# Patient Record
Sex: FEMALE | Race: WHITE | NOT HISPANIC OR LATINO | Employment: UNEMPLOYED | URBAN - METROPOLITAN AREA
[De-identification: names, ages, dates, MRNs, and addresses within clinical notes are randomized per-mention and may not be internally consistent; named-entity substitution may affect disease eponyms.]

---

## 2017-02-08 ENCOUNTER — OFFICE VISIT (OUTPATIENT)
Dept: RETAIL CLINIC | Facility: CLINIC | Age: 62
End: 2017-02-08

## 2017-02-08 VITALS
BODY MASS INDEX: 31.28 KG/M2 | SYSTOLIC BLOOD PRESSURE: 158 MMHG | HEART RATE: 110 BPM | HEIGHT: 62 IN | TEMPERATURE: 99 F | OXYGEN SATURATION: 97 % | WEIGHT: 170 LBS | DIASTOLIC BLOOD PRESSURE: 96 MMHG

## 2017-02-08 DIAGNOSIS — J01.10 ACUTE NON-RECURRENT FRONTAL SINUSITIS: Primary | ICD-10-CM

## 2017-02-08 PROCEDURE — 99213 OFFICE O/P EST LOW 20 MIN: CPT | Performed by: NURSE PRACTITIONER

## 2017-02-08 RX ORDER — AMOXICILLIN AND CLAVULANATE POTASSIUM 875; 125 MG/1; MG/1
1 TABLET, FILM COATED ORAL 2 TIMES DAILY
Qty: 20 TABLET | Refills: 0 | Status: SHIPPED | OUTPATIENT
Start: 2017-02-08 | End: 2017-08-08

## 2017-02-08 NOTE — PATIENT INSTRUCTIONS
Sinusitis, Adult  Sinusitis is redness, soreness, and puffiness (inflammation) of the air pockets in the bones of your face (sinuses). The redness, soreness, and puffiness can cause air and mucus to get trapped in your sinuses. This can allow germs to grow and cause an infection.   HOME CARE   · Drink enough fluids to keep your pee (urine) clear or pale yellow.  · Use a humidifier in your home.  · Run a hot shower to create steam in the bathroom. Sit in the bathroom with the door closed. Breathe in the steam 3-4 times a day.  · Put a warm, moist washcloth on your face 3-4 times a day, or as told by your doctor.  · Use salt water sprays (saline sprays) to wet the thick fluid in your nose. This can help the sinuses drain.  · Only take medicine as told by your doctor.  GET HELP RIGHT AWAY IF:   · Your pain gets worse.  · You have very bad headaches.  · You are sick to your stomach (nauseous).  · You throw up (vomit).  · You are very sleepy (drowsy) all the time.  · Your face is puffy (swollen).  · Your vision changes.  · You have a stiff neck.  · You have trouble breathing.  MAKE SURE YOU:   · Understand these instructions.  · Will watch your condition.  · Will get help right away if you are not doing well or get worse.     This information is not intended to replace advice given to you by your health care provider. Make sure you discuss any questions you have with your health care provider.     Document Released: 06/05/2009 Document Revised: 01/08/2016 Document Reviewed: 07/23/2013  [employer] Patient Education ©2016 [employer]

## 2017-02-08 NOTE — PROGRESS NOTES
Subjective   Livier Valerio is a 62 y.o. female.     History of Present Illness   Pt. Presents with left sided sinus pressure and pain radiating to her left ear and down the cervical neck area. She has had low grade fever and is taking tylenol for pain.   She has nasal congestion with yellow exudate and a taste in her mouth like infection.  The following portions of the patient's history were reviewed and updated as appropriate: allergies, current medications, past family history, past medical history, past surgical history and problem list.    Review of Systems   Constitutional: Negative for activity change, appetite change, chills, fatigue and fever.   HENT: Positive for congestion and ear pain (left). Negative for ear discharge, rhinorrhea and sore throat. Sinus pressure: left.    Eyes: Negative.    Respiratory: Negative.    Cardiovascular: Negative.    Musculoskeletal: Positive for myalgias.   Skin: Negative.    Allergic/Immunologic: Negative.    Neurological: Positive for dizziness and headaches.       Objective   Physical Exam   Constitutional: She is oriented to person, place, and time. She appears well-developed and well-nourished.   HENT:   Head: Normocephalic and atraumatic.   Right Ear: Tympanic membrane and external ear normal.   Left Ear: Tympanic membrane and external ear normal.   Nose: Mucosal edema and rhinorrhea present. Right sinus exhibits no maxillary sinus tenderness and no frontal sinus tenderness. Left sinus exhibits maxillary sinus tenderness and frontal sinus tenderness.   Mouth/Throat: Oropharynx is clear and moist and mucous membranes are normal. Tonsils are 0 on the right. Tonsils are 0 on the left. No tonsillar exudate.   Cardiovascular: Normal rate, regular rhythm and normal heart sounds.    Pulmonary/Chest: Effort normal and breath sounds normal. No respiratory distress. She has no wheezes. She has no rales.   Lymphadenopathy:     She has no cervical adenopathy.   Neurological: She is  alert and oriented to person, place, and time.   Skin: Skin is warm and dry.   Psychiatric: She has a normal mood and affect. Her behavior is normal. Judgment and thought content normal.   Nursing note and vitals reviewed.      Assessment/Plan   Livier was seen today for earache and sinusitis.    Diagnoses and all orders for this visit:    Acute non-recurrent frontal sinusitis    Other orders  -     amoxicillin-clavulanate (AUGMENTIN) 875-125 MG per tablet; Take 1 tablet by mouth 2 (Two) Times a Day.    JAYLON Doty

## 2017-08-08 ENCOUNTER — OFFICE VISIT (OUTPATIENT)
Dept: RETAIL CLINIC | Facility: CLINIC | Age: 62
End: 2017-08-08

## 2017-08-08 VITALS
HEART RATE: 84 BPM | SYSTOLIC BLOOD PRESSURE: 162 MMHG | TEMPERATURE: 98.1 F | DIASTOLIC BLOOD PRESSURE: 92 MMHG | RESPIRATION RATE: 18 BRPM

## 2017-08-08 DIAGNOSIS — J01.41 ACUTE RECURRENT PANSINUSITIS: Primary | ICD-10-CM

## 2017-08-08 PROCEDURE — 99213 OFFICE O/P EST LOW 20 MIN: CPT | Performed by: NURSE PRACTITIONER

## 2017-08-08 RX ORDER — AMOXICILLIN AND CLAVULANATE POTASSIUM 875; 125 MG/1; MG/1
1 TABLET, FILM COATED ORAL 2 TIMES DAILY
Qty: 20 TABLET | Refills: 0 | Status: SHIPPED | OUTPATIENT
Start: 2017-08-08 | End: 2017-08-18

## 2017-08-08 NOTE — PROGRESS NOTES
Subjective   Livier Valerio is a 62 y.o. female.     HPI Comments: 2 weeks duration of congestion, worsening with left sided facial pain and left sided ear pain today. Headaches. Using OTC phenylephrine.        The following portions of the patient's history were reviewed and updated as appropriate: allergies, current medications, past family history, past medical history, past social history and past surgical history.    Review of Systems   Constitutional: Positive for chills and fatigue. Negative for appetite change and fever.   HENT: Positive for congestion, ear pain and sinus pressure. Negative for sore throat.    Respiratory: Negative for cough.    Neurological: Positive for headaches.       Objective   Physical Exam   Constitutional: She appears well-developed.   HENT:   Head: Normocephalic.   Right Ear: Tympanic membrane normal.   Left Ear: Tympanic membrane normal.   Nose: Mucosal edema present.   Mouth/Throat: Oropharynx is clear and moist.   Eyes: Pupils are equal, round, and reactive to light.   Neck: Normal range of motion.   Cardiovascular: Normal rate and regular rhythm.    Pulmonary/Chest: Effort normal and breath sounds normal.       Assessment/Plan   Diagnoses and all orders for this visit:    Acute recurrent pansinusitis    Other orders  -     amoxicillin-clavulanate (AUGMENTIN) 875-125 MG per tablet; Take 1 tablet by mouth 2 (Two) Times a Day for 10 days.           Encouraged to not use decongestants due to elevated BP. Encouraged BP recheck in a few days and follow up with PCP

## 2018-03-23 ENCOUNTER — OFFICE VISIT (OUTPATIENT)
Dept: INTERNAL MEDICINE | Facility: CLINIC | Age: 63
End: 2018-03-23

## 2018-03-23 VITALS
SYSTOLIC BLOOD PRESSURE: 128 MMHG | RESPIRATION RATE: 16 BRPM | TEMPERATURE: 97.3 F | BODY MASS INDEX: 30.91 KG/M2 | DIASTOLIC BLOOD PRESSURE: 88 MMHG | WEIGHT: 168 LBS | HEART RATE: 68 BPM | HEIGHT: 62 IN

## 2018-03-23 DIAGNOSIS — M62.838 MUSCLE SPASM: ICD-10-CM

## 2018-03-23 DIAGNOSIS — R07.89 CHEST DISCOMFORT: ICD-10-CM

## 2018-03-23 DIAGNOSIS — Z13.220 LIPID SCREENING: ICD-10-CM

## 2018-03-23 DIAGNOSIS — F41.9 ANXIETY: Primary | ICD-10-CM

## 2018-03-23 DIAGNOSIS — E55.9 VITAMIN D DEFICIENCY: ICD-10-CM

## 2018-03-23 DIAGNOSIS — Z79.899 HIGH RISK MEDICATION USE: ICD-10-CM

## 2018-03-23 LAB
25(OH)D3 SERPL-MCNC: 34.5 NG/ML
ALBUMIN SERPL-MCNC: 4.6 G/DL (ref 3.2–4.8)
ALBUMIN/GLOB SERPL: 1.6 G/DL (ref 1.5–2.5)
ALP SERPL-CCNC: 87 U/L (ref 25–100)
ALT SERPL W P-5'-P-CCNC: 29 U/L (ref 7–40)
ANION GAP SERPL CALCULATED.3IONS-SCNC: 11 MMOL/L (ref 3–11)
ARTICHOKE IGE QN: 134 MG/DL (ref 0–130)
AST SERPL-CCNC: 29 U/L (ref 0–33)
BILIRUB SERPL-MCNC: 0.5 MG/DL (ref 0.3–1.2)
BUN BLD-MCNC: 8 MG/DL (ref 9–23)
BUN/CREAT SERPL: 10 (ref 7–25)
CALCIUM SPEC-SCNC: 9.8 MG/DL (ref 8.7–10.4)
CHLORIDE SERPL-SCNC: 102 MMOL/L (ref 99–109)
CHOLEST SERPL-MCNC: 215 MG/DL (ref 0–200)
CO2 SERPL-SCNC: 28 MMOL/L (ref 20–31)
CREAT BLD-MCNC: 0.8 MG/DL (ref 0.6–1.3)
DEPRECATED RDW RBC AUTO: 42.4 FL (ref 37–54)
ERYTHROCYTE [DISTWIDTH] IN BLOOD BY AUTOMATED COUNT: 12.5 % (ref 11.3–14.5)
GFR SERPL CREATININE-BSD FRML MDRD: 73 ML/MIN/1.73
GLOBULIN UR ELPH-MCNC: 2.8 GM/DL
GLUCOSE BLD-MCNC: 86 MG/DL (ref 70–100)
HCT VFR BLD AUTO: 39.6 % (ref 34.5–44)
HDLC SERPL-MCNC: 54 MG/DL (ref 40–60)
HGB BLD-MCNC: 13.2 G/DL (ref 11.5–15.5)
MCH RBC QN AUTO: 30.8 PG (ref 27–31)
MCHC RBC AUTO-ENTMCNC: 33.3 G/DL (ref 32–36)
MCV RBC AUTO: 92.5 FL (ref 80–99)
PLATELET # BLD AUTO: 278 10*3/MM3 (ref 150–450)
PMV BLD AUTO: 10.1 FL (ref 6–12)
POTASSIUM BLD-SCNC: 4.3 MMOL/L (ref 3.5–5.5)
PROT SERPL-MCNC: 7.4 G/DL (ref 5.7–8.2)
RBC # BLD AUTO: 4.28 10*6/MM3 (ref 3.89–5.14)
SODIUM BLD-SCNC: 141 MMOL/L (ref 132–146)
T4 FREE SERPL-MCNC: 1.27 NG/DL (ref 0.89–1.76)
TRIGL SERPL-MCNC: 128 MG/DL (ref 0–150)
TSH SERPL DL<=0.05 MIU/L-ACNC: 2.11 MIU/ML (ref 0.35–5.35)
WBC NRBC COR # BLD: 6.79 10*3/MM3 (ref 3.5–10.8)

## 2018-03-23 PROCEDURE — 84439 ASSAY OF FREE THYROXINE: CPT | Performed by: INTERNAL MEDICINE

## 2018-03-23 PROCEDURE — 93000 ELECTROCARDIOGRAM COMPLETE: CPT | Performed by: INTERNAL MEDICINE

## 2018-03-23 PROCEDURE — 80053 COMPREHEN METABOLIC PANEL: CPT | Performed by: INTERNAL MEDICINE

## 2018-03-23 PROCEDURE — 80061 LIPID PANEL: CPT | Performed by: INTERNAL MEDICINE

## 2018-03-23 PROCEDURE — 85027 COMPLETE CBC AUTOMATED: CPT | Performed by: INTERNAL MEDICINE

## 2018-03-23 PROCEDURE — 99204 OFFICE O/P NEW MOD 45 MIN: CPT | Performed by: INTERNAL MEDICINE

## 2018-03-23 PROCEDURE — 84443 ASSAY THYROID STIM HORMONE: CPT | Performed by: INTERNAL MEDICINE

## 2018-03-23 PROCEDURE — 82306 VITAMIN D 25 HYDROXY: CPT | Performed by: INTERNAL MEDICINE

## 2018-03-23 PROCEDURE — 36415 COLL VENOUS BLD VENIPUNCTURE: CPT | Performed by: INTERNAL MEDICINE

## 2018-03-23 RX ORDER — ALPRAZOLAM 0.25 MG/1
0.25 TABLET ORAL DAILY PRN
Qty: 30 TABLET | Refills: 1 | Status: SHIPPED | OUTPATIENT
Start: 2018-03-23 | End: 2019-01-22

## 2018-03-23 RX ORDER — UBIDECARENONE 100 MG
100 CAPSULE ORAL DAILY
COMMUNITY

## 2018-03-23 RX ORDER — CYCLOBENZAPRINE HCL 10 MG
10 TABLET ORAL AS NEEDED
Qty: 30 TABLET | Refills: 1 | Status: SHIPPED | OUTPATIENT
Start: 2018-03-23 | End: 2018-08-02 | Stop reason: SDUPTHER

## 2018-03-23 RX ORDER — ALPRAZOLAM 0.25 MG/1
0.25 TABLET ORAL DAILY PRN
COMMUNITY
End: 2018-03-23 | Stop reason: SDUPTHER

## 2018-03-23 RX ORDER — IBUPROFEN 600 MG/1
600 TABLET ORAL EVERY 6 HOURS PRN
COMMUNITY
End: 2018-03-23 | Stop reason: SDUPTHER

## 2018-03-23 RX ORDER — LANOLIN ALCOHOL/MO/W.PET/CERES
CREAM (GRAM) TOPICAL
COMMUNITY
End: 2019-01-22

## 2018-03-23 RX ORDER — CYCLOBENZAPRINE HCL 10 MG
10 TABLET ORAL AS NEEDED
COMMUNITY
End: 2018-03-23 | Stop reason: SDUPTHER

## 2018-03-23 NOTE — PROGRESS NOTES
Subjective   Livier Valerio is a 63 y.o. female.     History of Present Illness     1 mild muscle spasm- occasionally with exercise.  Patient says that she will occasionally do resistance exercises and this will cause some soreness in the upper extremities along the chest wall as well.  She says that after one to 2 days of doing the physical activity she will develop chest soreness/discomfort along associated with the upper extremities and shoulders.  This shortly resolves after physical activity.  Patient denies any dyspnea on exertion, chest pain, syncope, nausea, vomiting, fever, diaphoresis, or any other systemic symptoms.    2 anxiety-chronic and controlled.  Patient is on Xanax 0.25 mg as needed for anxiety.  Patient denies any recent panic attack or any other emotional concerns at this time.    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    Review of Systems   Constitutional: Negative.    HENT: Negative.    Respiratory: Negative for cough, choking, chest tightness, shortness of breath and stridor.    Cardiovascular: Negative for chest pain, palpitations and leg swelling.   Gastrointestinal: Negative.    Genitourinary: Negative.    Musculoskeletal: Negative.  Negative for arthralgias, back pain, gait problem and joint swelling.   Skin: Negative.    Neurological: Negative.  Negative for dizziness, light-headedness, numbness and headaches.   Hematological: Negative.    Psychiatric/Behavioral: Negative.    All other systems reviewed and are negative.      Objective   Physical Exam   Constitutional: She is oriented to person, place, and time. She appears well-developed and well-nourished.   HENT:   Head:  Normocephalic.   Right Ear: External ear normal.   Left Ear: External ear normal.   Nose: Nose normal.   Mouth/Throat: Oropharynx is clear and moist.   Eyes: Conjunctivae and EOM are normal. Pupils are equal, round, and reactive to light.   Neck: Normal range of motion. Neck supple.   Cardiovascular: Normal rate, regular rhythm, normal heart sounds and intact distal pulses.    Pulmonary/Chest: Effort normal and breath sounds normal.   Abdominal: Soft. Bowel sounds are normal.   Musculoskeletal: Normal range of motion.   Neurological: She is alert and oriented to person, place, and time. She has normal reflexes.   Skin: Skin is warm. Capillary refill takes less than 2 seconds.   Nursing note and vitals reviewed.      Assessment/Plan   Livier was seen today for establish care, anxiety and spasms.    Diagnoses and all orders for this visit:    Anxiety  -     CBC (No Diff)  -     Comprehensive Metabolic Panel  -     T4, Free  -     TSH    Muscle spasm    Chest discomfort  -     ECG 12 Lead    Lipid screening  -     Lipid Panel          ECG 12 Lead  Date/Time: 3/23/2018 9:43 AM  Performed by: CHANTALE ORONA  Authorized by: CHANTALE ORONA   Comparison: not compared with previous ECG   Rhythm: sinus rhythm  Rate: normal  Conduction: conduction normal  ST Segments: ST segments normal  QRS axis: normal  Clinical impression: normal ECG

## 2018-03-23 NOTE — TELEPHONE ENCOUNTER
----- Message from Karol aCno sent at 3/23/2018  1:11 PM EDT -----  Contact: SELF  ERYN BERGER WAS SEEN TODAY, SHE IS NEEDING A REFILL FOR IBUPROFEN 600 MG. SHE ALSO SAID THE PHARMACY SAID THEY DID NOT RECEIVE THE ALPRAZOLAM EITHER.  SHE USES THE [facility] IN [place]. SHE CAN BE REACHED [phone]

## 2018-03-26 RX ORDER — IBUPROFEN 600 MG/1
600 TABLET ORAL EVERY 6 HOURS PRN
Qty: 60 TABLET | Refills: 2 | Status: SHIPPED | OUTPATIENT
Start: 2018-03-26 | End: 2019-01-22 | Stop reason: SDUPTHER

## 2018-06-19 ENCOUNTER — CLINICAL SUPPORT (OUTPATIENT)
Dept: RETAIL CLINIC | Facility: CLINIC | Age: 63
End: 2018-06-19

## 2018-06-19 DIAGNOSIS — Z11.1 VISIT FOR TB SKIN TEST: Primary | ICD-10-CM

## 2018-06-19 PROCEDURE — 86580 TB INTRADERMAL TEST: CPT | Performed by: NURSE PRACTITIONER

## 2018-06-19 NOTE — PROGRESS NOTES
Subjective   Livier Valerio is a 63 y.o. female.     Patient presents to clinic for a TB skin test. See scanned documents. Denies history of positive TB skin test, exposure to TB, being on steroid therapy, or being immunosuppressed. Patient will be able to return to clinic in required time frame of 48-72 hours for TB skin test reading.    Diagnosis for this visit:  Visit for TB skin test [Z11.1]    Notes:  A TB skin test was performed for you today. Signs and symptoms of infection and site reaction discussed. Do not pick, scratch, or rub the test site. You must return to the clinic in 48-72 hours, then your copy of the reading result will be provided to you. Patient verbalized understanding.

## 2018-07-17 ENCOUNTER — OFFICE VISIT (OUTPATIENT)
Dept: INTERNAL MEDICINE | Facility: CLINIC | Age: 63
End: 2018-07-17

## 2018-07-17 VITALS
SYSTOLIC BLOOD PRESSURE: 130 MMHG | RESPIRATION RATE: 18 BRPM | BODY MASS INDEX: 31.39 KG/M2 | WEIGHT: 170.6 LBS | HEART RATE: 83 BPM | HEIGHT: 62 IN | DIASTOLIC BLOOD PRESSURE: 85 MMHG | TEMPERATURE: 98.4 F

## 2018-07-17 DIAGNOSIS — R35.0 URINE FREQUENCY: Primary | ICD-10-CM

## 2018-07-17 DIAGNOSIS — Z12.31 SCREENING MAMMOGRAM, ENCOUNTER FOR: ICD-10-CM

## 2018-07-17 LAB
BILIRUB BLD-MCNC: NEGATIVE MG/DL
CLARITY, POC: CLEAR
COLOR UR: YELLOW
EXPIRATION DATE: NORMAL
GLUCOSE UR STRIP-MCNC: NEGATIVE MG/DL
KETONES UR QL: NEGATIVE
LEUKOCYTE EST, POC: NEGATIVE
Lab: NORMAL
NITRITE UR-MCNC: NEGATIVE MG/ML
PH UR: 7 [PH] (ref 5–8)
PROT UR STRIP-MCNC: NEGATIVE MG/DL
RBC # UR STRIP: NEGATIVE /UL
SP GR UR: 1.02 (ref 1–1.03)
UROBILINOGEN UR QL: NORMAL

## 2018-07-17 PROCEDURE — 99213 OFFICE O/P EST LOW 20 MIN: CPT | Performed by: NURSE PRACTITIONER

## 2018-07-17 PROCEDURE — 81003 URINALYSIS AUTO W/O SCOPE: CPT | Performed by: NURSE PRACTITIONER

## 2018-07-17 RX ORDER — SULFAMETHOXAZOLE AND TRIMETHOPRIM 800; 160 MG/1; MG/1
1 TABLET ORAL 2 TIMES DAILY
Qty: 14 TABLET | Refills: 0 | Status: SHIPPED | OUTPATIENT
Start: 2018-07-17 | End: 2018-07-23

## 2018-07-17 NOTE — PROGRESS NOTES
Subjective:    Livier Valerio is a 63 y.o. female.     Chief Complaint   Patient presents with   • Urinary Tract Infection     lower back pain, burning when urinating and frequency x4 days    • Fever     low grade        History of Present Illness   Patient complains of lower back pain and lower abdominal pressure, urinary frequency, urgency and dysuria x 4 days. She has had an elevated temperature of 100.4 treated with Tylenol successfully. Minimal relief with drinking lemon water. Patient states she has changed soap brands recently.    Current Outpatient Prescriptions:   •  ALPRAZolam (XANAX) 0.25 MG tablet, Take 1 tablet by mouth Daily As Needed for Anxiety., Disp: 30 tablet, Rfl: 1  •  coenzyme Q10 100 MG capsule, Take 100 mg by mouth Daily., Disp: , Rfl:   •  cyclobenzaprine (FLEXERIL) 10 MG tablet, Take 1 tablet by mouth As Needed for Muscle Spasms., Disp: 30 tablet, Rfl: 1  •  Garlic 500 MG capsule, Take  by mouth., Disp: , Rfl:   •  ibuprofen (ADVIL,MOTRIN) 600 MG tablet, Take 1 tablet by mouth Every 6 (Six) Hours As Needed for Mild Pain ., Disp: 60 tablet, Rfl: 2  •  Multiple Vitamin (MULTI-DAY VITAMINS PO), Take  by mouth., Disp: , Rfl:   •  Oxymetazoline HCl (NASAL SPRAY NA), into each nostril. [employer] brand, Disp: , Rfl:   •  Probiotic Product (PROBIOTIC DAILY PO), Take  by mouth., Disp: , Rfl:   •  Nutritional Supplements (ESTROVEN PO), Take  by mouth., Disp: , Rfl:   •  Pyridoxine HCl (VITAMIN B-6 PO), Take  by mouth., Disp: , Rfl:   •  sulfamethoxazole-trimethoprim (BACTRIM DS) 800-160 MG per tablet, Take 1 tablet by mouth 2 (Two) Times a Day., Disp: 14 tablet, Rfl: 0     The following portions of the patient's history were reviewed and updated as appropriate: allergies, current medications, past family history, past medical history, past social history, past surgical history and problem list.    Review of Systems   Constitutional: Negative for chills and fever.   Respiratory: Negative for cough and  "shortness of breath.    Cardiovascular: Negative for chest pain.   Gastrointestinal: Positive for abdominal pain. Negative for blood in stool, constipation, diarrhea, nausea and vomiting.        Denies melena.   Genitourinary: Positive for dysuria, frequency and pelvic pain. Negative for flank pain, hematuria, urgency, vaginal bleeding and vaginal discharge.        Denies vaginal itching.   Musculoskeletal: Positive for back pain.   Skin: Negative for rash.   Hematological: Negative for adenopathy.       Objective:    /85 (BP Location: Right arm, Patient Position: Sitting, Cuff Size: Adult)   Pulse 83   Temp 98.4 °F (36.9 °C)   Resp 18   Ht 157.5 cm (62\")   Wt 77.4 kg (170 lb 9.6 oz)   BMI 31.20 kg/m²     Physical Exam   Constitutional: She appears well-developed and well-nourished.   Cardiovascular: Normal rate, regular rhythm and normal heart sounds.    No murmur heard.  Pulmonary/Chest: Effort normal and breath sounds normal.   Abdominal: Soft. Bowel sounds are normal. She exhibits no distension and no mass. There is no hepatosplenomegaly. There is no tenderness. There is no CVA tenderness.   Lymphadenopathy:        Right: No inguinal adenopathy present.        Left: No inguinal adenopathy present.   Neurological: She is alert.   Skin: No rash noted.   Psychiatric: She has a normal mood and affect.   Nursing note and vitals reviewed.      Assessment/Plan:    Livier was seen today for urinary symptoms and fever.    Diagnoses and all orders for this visit:        Livier was seen today for urinary symptoms and fever.    Diagnoses and all orders for this visit:    Urine frequency  -     POCT urinalysis dipstick, automated    Screening mammogram, encounter for  -     Mammo Screening Bilateral With CAD; Future    Other orders  -     sulfamethoxazole-trimethoprim (BACTRIM DS) 800-160 MG per tablet; Take 1 tablet by mouth 2 (Two) Times a Day.          Return if symptoms worsen or fail to improve, schedule " physical with pap.

## 2018-07-21 NOTE — PROGRESS NOTES
Chief Complaint   Patient presents with   • Annual Exam     physical and PAP       Subjective   Physical    History of Present Illness   Physical  Livier Valerio is a 63 y.o. female.     Are you currently seeing any other doctors or specialists?  No  Are you currently taking any OTC medications or herbal medications?   Garlique and echinacea tea  Sleep: 10 hours  Diet: Balanced  Exercise: Walks     Most recent colonoscopy: No and does not plan on it . But agrees to Cologuard  Most recent mammogram: Scheduled 8/22/2018  Most recent pap smear: Today  First day of last menses:  Age 56    Regular dental visits: Dentures  Regular eye exams: Needs to schedule    The following portions of the patient's history were reviewed and updated as appropriate: allergies, current medications, past family history, past medical history, past social history, past surgical history and problem list.      Allergies   Allergen Reactions   • No Known Drug Allergy      Social History   Substance Use Topics   • Smoking status: Never Smoker   • Smokeless tobacco: Never Used   • Alcohol use No     History reviewed. No pertinent surgical history.  Family History   Problem Relation Age of Onset   • Diabetes Mother    • Hyperlipidemia Mother    • Heart attack Maternal Grandmother    • Heart attack Maternal Grandfather          Current Outpatient Prescriptions:   •  ALPRAZolam (XANAX) 0.25 MG tablet, Take 1 tablet by mouth Daily As Needed for Anxiety., Disp: 30 tablet, Rfl: 1  •  coenzyme Q10 100 MG capsule, Take 100 mg by mouth Daily., Disp: , Rfl:   •  cyclobenzaprine (FLEXERIL) 10 MG tablet, Take 1 tablet by mouth As Needed for Muscle Spasms., Disp: 30 tablet, Rfl: 1  •  Garlic 500 MG capsule, Take  by mouth., Disp: , Rfl:   •  ibuprofen (ADVIL,MOTRIN) 600 MG tablet, Take 1 tablet by mouth Every 6 (Six) Hours As Needed for Mild Pain ., Disp: 60 tablet, Rfl: 2  •  Multiple Vitamin (MULTI-DAY VITAMINS PO), Take  by mouth., Disp: , Rfl:   •   Oxymetazoline HCl (NASAL SPRAY NA), into each nostril. [employer] brand, Disp: , Rfl:   •  Probiotic Product (PROBIOTIC DAILY PO), Take  by mouth., Disp: , Rfl:   •  Nutritional Supplements (ESTROVEN PO), Take  by mouth., Disp: , Rfl:   •  Pyridoxine HCl (VITAMIN B-6 PO), Take  by mouth., Disp: , Rfl:   •  sulfamethoxazole-trimethoprim (BACTRIM DS) 800-160 MG per tablet, Take 1 tablet by mouth 2 (Two) Times a Day., Disp: 14 tablet, Rfl: 0    There is no problem list on file for this patient.      Review of Systems   Constitutional: Negative for chills, fatigue and fever.   HENT: Negative for congestion, dental problem, mouth sores, nosebleeds, postnasal drip, rhinorrhea, sinus pressure, sneezing and sore throat.    Eyes: Negative for pain, discharge, redness and itching.   Respiratory: Negative for cough, shortness of breath and wheezing.    Cardiovascular: Negative for chest pain, palpitations and leg swelling.        No TAPIA, orthopnea, PND, or claudication.   Gastrointestinal: Negative for abdominal distention, abdominal pain, blood in stool, diarrhea, nausea and vomiting.   Endocrine: Negative for cold intolerance, heat intolerance, polydipsia and polyuria.   Genitourinary: Negative for difficulty urinating, dysuria, frequency, hematuria and urgency.   Musculoskeletal: Negative for arthralgias, gait problem, joint swelling and myalgias.   Skin: Negative for color change and rash.        No wound.   Allergic/Immunologic: Negative.    Neurological: Negative for dizziness, syncope, weakness, light-headedness and numbness.   Hematological: Negative for adenopathy. Does not bruise/bleed easily.   Psychiatric/Behavioral:        Anxiety       Objective   Vitals:    07/23/18 0817   BP: 138/87   Pulse: 86   Resp: 18   Temp: 97.9 °F (36.6 °C)     Physical Exam   Constitutional: She is oriented to person, place, and time. She appears well-developed and well-nourished. She is cooperative. She is easily aroused.  Non-toxic  appearance. She does not have a sickly appearance. She does not appear ill. No distress.   HENT:   Head: Normocephalic and atraumatic. Head is without abrasion. Hair is normal.   Right Ear: Hearing, tympanic membrane, external ear and ear canal normal. No foreign bodies. Tympanic membrane is not perforated and not erythematous.   Left Ear: Hearing, tympanic membrane, external ear and ear canal normal. No foreign bodies. Tympanic membrane is not perforated and not erythematous.   Nose: Nose normal. No mucosal edema, rhinorrhea or septal deviation. No epistaxis.  No foreign bodies.   Mouth/Throat: Oropharynx is clear and moist. No oral lesions. Normal dentition.   Eyes: Pupils are equal, round, and reactive to light. Conjunctivae and lids are normal. Right eye exhibits no discharge. Left eye exhibits no discharge. Right conjunctiva is not injected. Left conjunctiva is not injected. No scleral icterus.   Neck: Normal range of motion and full passive range of motion without pain. Neck supple. No edema and normal range of motion present. No thyroid mass and no thyromegaly present.   Cardiovascular: Normal rate, regular rhythm and normal heart sounds.  Exam reveals no gallop and no friction rub.    No murmur heard.  Pulmonary/Chest: Effort normal and breath sounds normal. No accessory muscle usage. She has no rhonchi. She has no rales. She exhibits no mass, no tenderness, no bony tenderness, no laceration, no crepitus, no edema, no deformity, no swelling and no retraction. Right breast exhibits no inverted nipple, no mass, no nipple discharge, no skin change and no tenderness. Left breast exhibits no inverted nipple, no mass, no nipple discharge, no skin change and no tenderness. Breasts are symmetrical. There is no breast swelling.   Abdominal: Soft. Bowel sounds are normal. She exhibits no distension. There is no hepatosplenomegaly or hepatomegaly. There is no tenderness. There is no CVA tenderness. Hernia confirmed  negative in the right inguinal area and confirmed negative in the left inguinal area.   Genitourinary: Rectum normal, vagina normal, uterus normal and cervix normal. Pelvic exam was performed with patient supine. There is no rash, tenderness, lesion, injury or Bartholin's cyst on the right labia. There is no rash, tenderness, lesion, injury or Bartholin's cyst on the left labia. Right adnexum displays fullness. Right adnexum displays no mass and no tenderness. Right adnexum is present and palpable.Left adnexum displays no mass, no tenderness and no fullness. Left adnexum is present and palpable.  Musculoskeletal: Normal range of motion. She exhibits no edema, tenderness or deformity.   Lymphadenopathy:     She has no cervical adenopathy.        Right: No inguinal adenopathy present.        Left: No inguinal adenopathy present.   Neurological: She is alert, oriented to person, place, and time and easily aroused. She has normal reflexes. No cranial nerve deficit. Coordination normal.   Muscle strength 5/5 and equal throughout.   Skin: Skin is warm, dry and intact. No abrasion and no rash noted. She is not diaphoretic. No cyanosis or erythema. Nails show no clubbing.   Psychiatric: She has a normal mood and affect. Her speech is normal and behavior is normal.   Nursing note and vitals reviewed.        Results for orders placed or performed in visit on 07/17/18   POCT urinalysis dipstick, automated   Result Value Ref Range    Color Yellow Yellow, Straw, Dark Yellow, Lyly    Clarity, UA Clear Clear    Specific Gravity  1.020 1.005 - 1.030    pH, Urine 7.0 5.0 - 8.0    Leukocytes Negative Negative    Nitrite, UA Negative Negative    Protein, POC Negative Negative mg/dL    Glucose, UA Negative Negative, 1000 mg/dL (3+) mg/dL    Ketones, UA Negative Negative    Urobilinogen, UA Normal Normal    Bilirubin Negative Negative    Blood, UA Negative Negative    Lot Number [id number]     Expiration Date 02/28/19         Assessment/Plan   Livier was seen today for annual exam.    Diagnoses and all orders for this visit:    Encounter for preventive health examination    Need for hepatitis C screening test  -     Cologuard - Stool, Per Rectum; Future    Encounter for screening colonoscopy  -     Hepatitis C antibody    Pap smear for cervical cancer screening  -     Liquid-based Pap Smear, Screening; Future    Need for vaccination  -     Tdap Vaccine Greater Than or Equal To 6yo IM        Discussed getting Shingrix vaccine at pharmacy.  Maintain mammogram appointment and complete Cologuard test.       Patient education discussed during this visit:  - avoidance of texting while driving and the need for wearing seatbelt  - use of sunscreen  - healthy sleep habits and appropriate amount of sleep  - H2O consumption, well-balanced diet  - exercise routine which includes at least 150 minutes of cardio per week + muscle strengthening exercises  - immunizations including annual flu vaccination      Return in about 1 year (around 7/23/2019), or if symptoms worsen or fail to improve.

## 2018-07-23 ENCOUNTER — RESULTS ENCOUNTER (OUTPATIENT)
Dept: INTERNAL MEDICINE | Facility: CLINIC | Age: 63
End: 2018-07-23

## 2018-07-23 ENCOUNTER — OFFICE VISIT (OUTPATIENT)
Dept: INTERNAL MEDICINE | Facility: CLINIC | Age: 63
End: 2018-07-23

## 2018-07-23 VITALS
WEIGHT: 169.2 LBS | SYSTOLIC BLOOD PRESSURE: 138 MMHG | BODY MASS INDEX: 31.14 KG/M2 | RESPIRATION RATE: 18 BRPM | DIASTOLIC BLOOD PRESSURE: 87 MMHG | HEIGHT: 62 IN | TEMPERATURE: 97.9 F | HEART RATE: 86 BPM

## 2018-07-23 DIAGNOSIS — Z23 NEED FOR VACCINATION: ICD-10-CM

## 2018-07-23 DIAGNOSIS — Z00.00 ENCOUNTER FOR PREVENTIVE HEALTH EXAMINATION: Primary | ICD-10-CM

## 2018-07-23 DIAGNOSIS — Z11.59 NEED FOR HEPATITIS C SCREENING TEST: ICD-10-CM

## 2018-07-23 DIAGNOSIS — Z12.4 PAP SMEAR FOR CERVICAL CANCER SCREENING: ICD-10-CM

## 2018-07-23 DIAGNOSIS — Z12.11 SCREEN FOR COLON CANCER: ICD-10-CM

## 2018-07-23 LAB — HCV AB SER DONR QL: NORMAL

## 2018-07-23 PROCEDURE — 36415 COLL VENOUS BLD VENIPUNCTURE: CPT | Performed by: NURSE PRACTITIONER

## 2018-07-23 PROCEDURE — 99396 PREV VISIT EST AGE 40-64: CPT | Performed by: NURSE PRACTITIONER

## 2018-07-23 PROCEDURE — 86803 HEPATITIS C AB TEST: CPT | Performed by: NURSE PRACTITIONER

## 2018-07-23 PROCEDURE — 90471 IMMUNIZATION ADMIN: CPT | Performed by: NURSE PRACTITIONER

## 2018-07-23 PROCEDURE — 90715 TDAP VACCINE 7 YRS/> IM: CPT | Performed by: NURSE PRACTITIONER

## 2018-07-26 ENCOUNTER — TELEPHONE (OUTPATIENT)
Dept: INTERNAL MEDICINE | Facility: CLINIC | Age: 63
End: 2018-07-26

## 2018-08-02 DIAGNOSIS — M62.838 MUSCLE SPASM: ICD-10-CM

## 2018-08-02 RX ORDER — CYCLOBENZAPRINE HCL 10 MG
10 TABLET ORAL AS NEEDED
Qty: 30 TABLET | Refills: 1 | Status: SHIPPED | OUTPATIENT
Start: 2018-08-02 | End: 2018-08-07 | Stop reason: SDUPTHER

## 2018-08-07 ENCOUNTER — TELEPHONE (OUTPATIENT)
Dept: INTERNAL MEDICINE | Facility: CLINIC | Age: 63
End: 2018-08-07

## 2018-08-07 ENCOUNTER — HOSPITAL ENCOUNTER (OUTPATIENT)
Dept: GENERAL RADIOLOGY | Facility: HOSPITAL | Age: 63
Discharge: HOME | End: 2018-08-07
Admitting: NURSE PRACTITIONER

## 2018-08-07 ENCOUNTER — OFFICE VISIT (OUTPATIENT)
Dept: INTERNAL MEDICINE | Facility: CLINIC | Age: 63
End: 2018-08-07

## 2018-08-07 VITALS
RESPIRATION RATE: 17 BRPM | SYSTOLIC BLOOD PRESSURE: 140 MMHG | BODY MASS INDEX: 31.62 KG/M2 | WEIGHT: 171.8 LBS | TEMPERATURE: 97.2 F | DIASTOLIC BLOOD PRESSURE: 88 MMHG | HEIGHT: 62 IN | HEART RATE: 87 BPM

## 2018-08-07 DIAGNOSIS — M62.838 MUSCLE SPASM: ICD-10-CM

## 2018-08-07 DIAGNOSIS — M54.50 ACUTE LOW BACK PAIN WITHOUT SCIATICA, UNSPECIFIED BACK PAIN LATERALITY: Primary | ICD-10-CM

## 2018-08-07 DIAGNOSIS — M54.50 ACUTE LOW BACK PAIN WITHOUT SCIATICA, UNSPECIFIED BACK PAIN LATERALITY: ICD-10-CM

## 2018-08-07 PROBLEM — T14.8XXA MUSCLE STRAIN: Status: ACTIVE | Noted: 2018-08-07

## 2018-08-07 PROCEDURE — 99214 OFFICE O/P EST MOD 30 MIN: CPT | Performed by: NURSE PRACTITIONER

## 2018-08-07 PROCEDURE — 72100 X-RAY EXAM L-S SPINE 2/3 VWS: CPT

## 2018-08-07 RX ORDER — CYCLOBENZAPRINE HCL 10 MG
10 TABLET ORAL AS NEEDED
Qty: 30 TABLET | Refills: 0 | Status: SHIPPED | OUTPATIENT
Start: 2018-08-07 | End: 2019-06-14 | Stop reason: SDUPTHER

## 2018-08-07 RX ORDER — PREDNISONE 20 MG/1
20 TABLET ORAL 2 TIMES DAILY
Qty: 10 TABLET | Refills: 0 | Status: SHIPPED | OUTPATIENT
Start: 2018-08-07 | End: 2018-12-21

## 2018-08-07 NOTE — TELEPHONE ENCOUNTER
----- Message from JAYLON Gamino sent at 8/7/2018  1:10 PM EDT -----  Please inform patient that back x ray shows- Lumbar scoliosis with diffuse  osteoarthritis. There are no acute findings, however.

## 2018-08-07 NOTE — PROGRESS NOTES
"Subjective:    Livier Valerio is a 63 y.o. female.     Chief Complaint   Patient presents with   • Back Pain     lower back pain, hurt while tugging on someone x7 days        History of Present Illness   Patient complains of lower back pain that began last Tuesday when she was attempting to lift someone up in bed. This person is unable to assist lifting at all and weighs approximately 170 pounds. Pain is intermittent. Pain worsened with certain positions. She has used \"Icy Hot\", muscle relaxants,Tylenol and ibuprofen, rib belt and heat. When pain occurs it is stabbing pain and rates pain at 12/0-10 scale. Patient not interested in Toradol. Patient requesting muscle relaxant, steroids and pain medicine.     Current Outpatient Prescriptions:   •  coenzyme Q10 100 MG capsule, Take 100 mg by mouth Daily., Disp: , Rfl:   •  cyclobenzaprine (FLEXERIL) 10 MG tablet, Take 1 tablet by mouth As Needed for Muscle Spasms., Disp: 30 tablet, Rfl: 0  •  Garlic 500 MG capsule, Take  by mouth., Disp: , Rfl:   •  ibuprofen (ADVIL,MOTRIN) 600 MG tablet, Take 1 tablet by mouth Every 6 (Six) Hours As Needed for Mild Pain ., Disp: 60 tablet, Rfl: 2  •  Multiple Vitamin (MULTI-DAY VITAMINS PO), Take  by mouth., Disp: , Rfl:   •  Oxymetazoline HCl (NASAL SPRAY NA), into each nostril. [employer] brand, Disp: , Rfl:   •  Probiotic Product (PROBIOTIC DAILY PO), Take  by mouth., Disp: , Rfl:   •  ALPRAZolam (XANAX) 0.25 MG tablet, Take 1 tablet by mouth Daily As Needed for Anxiety., Disp: 30 tablet, Rfl: 1  •  Nutritional Supplements (ESTROVEN PO), Take  by mouth., Disp: , Rfl:   •  predniSONE (DELTASONE) 20 MG tablet, Take 1 tablet by mouth 2 (Two) Times a Day., Disp: 10 tablet, Rfl: 0  •  Pyridoxine HCl (VITAMIN B-6 PO), Take  by mouth., Disp: , Rfl:      The following portions of the patient's history were reviewed and updated as appropriate: allergies, current medications, past family history, past medical history, past social history, past " "surgical history and problem list.    Review of Systems   Constitutional: Negative for chills, fatigue and fever.   HENT: Negative for congestion, dental problem, mouth sores, nosebleeds, postnasal drip, rhinorrhea, sinus pain, sinus pressure, sneezing and sore throat.    Eyes: Negative for pain, discharge, redness and itching.   Respiratory: Negative for cough, shortness of breath and wheezing.    Cardiovascular: Negative for chest pain, palpitations and leg swelling.   Gastrointestinal: Negative for abdominal distention, abdominal pain, blood in stool, diarrhea, nausea and vomiting.   Endocrine: Negative for cold intolerance, heat intolerance, polydipsia and polyuria.   Genitourinary: Negative for difficulty urinating, dysuria, frequency, hematuria and urgency.   Musculoskeletal: Positive for back pain. Negative for arthralgias, gait problem, joint swelling and myalgias.   Skin: Negative for color change, rash and wound.   Neurological: Negative for dizziness, syncope, weakness, light-headedness, numbness and headaches.   Hematological: Negative for adenopathy. Does not bruise/bleed easily.       Objective:    /88 (BP Location: Right arm, Patient Position: Sitting, Cuff Size: Adult)   Pulse 87   Temp 97.2 °F (36.2 °C)   Resp 17   Ht 157.5 cm (62\")   Wt 77.9 kg (171 lb 12.8 oz)   BMI 31.42 kg/m²     Physical Exam   Constitutional: She is oriented to person, place, and time. She appears well-developed and well-nourished.   Neck: Normal range of motion. Neck supple.   There is no tenderness to palpation of the cervical spine or paraspinal muscles.   Cardiovascular: Normal rate, regular rhythm and normal heart sounds.    No murmur heard.  Pulmonary/Chest: Effort normal and breath sounds normal.   Abdominal: Soft. Bowel sounds are normal. She exhibits no distension and no mass. There is no hepatosplenomegaly. There is no tenderness.   No CVA tenderness.   Musculoskeletal:        Lumbar back: She exhibits " decreased range of motion, swelling and spasm. She exhibits no tenderness and no bony tenderness.   There is no tenderness to palpation over the lumbar or thoracic spine or paraspinal muscles.  Straight leg raise is negative bilaterally.  There is no pain with right hip flexion, extension, abduction, and adduction.  There is no pain with left hip flexion, extension, abduction, and adduction.   Neurological: She is alert and oriented to person, place, and time.   Skin: Skin is warm, dry and intact. No rash noted.   Psychiatric: She has a normal mood and affect.   Nursing note and vitals reviewed.      Assessment/Plan:    Livier was seen today for back pain.    Diagnoses and all orders for this visit:    Acute low back pain without sciatica, unspecified back pain laterality  -     XR spine lumbar 2 or 3 vw; Future  -     Ambulatory Referral to Physical Therapy    Muscle spasm  -     XR spine lumbar 2 or 3 vw; Future  -     cyclobenzaprine (FLEXERIL) 10 MG tablet; Take 1 tablet by mouth As Needed for Muscle Spasms.  -     Ambulatory Referral to Physical Therapy    Other orders  -     predniSONE (DELTASONE) 20 MG tablet; Take 1 tablet by mouth 2 (Two) Times a Day.        Return if symptoms worsen or fail to improve.

## 2018-08-08 ENCOUNTER — TELEPHONE (OUTPATIENT)
Dept: INTERNAL MEDICINE | Facility: CLINIC | Age: 63
End: 2018-08-08

## 2018-08-08 NOTE — TELEPHONE ENCOUNTER
PT WAS RETURNING A CALL SHE RECEIVED FROM OUR OFFICE IN REGARDS TO HER X-RAYS AND WOULD LIKE TO GET A CALL BACK -[phone]

## 2018-08-10 ENCOUNTER — TELEPHONE (OUTPATIENT)
Dept: INTERNAL MEDICINE | Facility: CLINIC | Age: 63
End: 2018-08-10

## 2018-08-10 NOTE — TELEPHONE ENCOUNTER
I cannot give more prednisone at this time. Please complete physical therapy and see if this gives relief for back pain.

## 2018-08-10 NOTE — TELEPHONE ENCOUNTER
----- Message from Brittni Strange sent at 8/10/2018  9:47 AM EDT -----  PATIENT STATED THAT SHE IS STARTING THERAPY TOMORROW AND IS WANTING TO KNOW IF SHE CAN GET SOME ADDITIONAL predniSONE (DELTASONE) 20 MG tablet    [facility] [address] [place] [address] [phone] [place] [phone]     PATIENT CALL BACK : [phone]    THANK YOU

## 2018-08-14 ENCOUNTER — TELEPHONE (OUTPATIENT)
Dept: INTERNAL MEDICINE | Facility: CLINIC | Age: 63
End: 2018-08-14

## 2018-08-14 RX ORDER — MELOXICAM 15 MG/1
15 TABLET ORAL DAILY
Qty: 30 TABLET | Refills: 0 | Status: SHIPPED | OUTPATIENT
Start: 2018-08-14 | End: 2018-12-21

## 2018-08-14 NOTE — TELEPHONE ENCOUNTER
INFORMED PATIENT OF MED AND LET HER KNOW THAT RX WAS ALREADY CALLED INTO THE PHARMACY. PATIENT WAS GRATEFUL AND VERB. UNDERSTANDING.

## 2018-08-14 NOTE — TELEPHONE ENCOUNTER
The injection is Toradol. I will order some oral antiinflammatory (Mobic)  medicine and she can begin that.

## 2018-08-14 NOTE — TELEPHONE ENCOUNTER
----- Message from Deborah Puentes LPN sent at 8/14/2018  8:25 AM EDT -----  Patient states that she was in to be seen for back pain and Christiano gave her oral steroids, but stated that if that didn't work there was a shot that she could give patient to help.  Patient is wondering if she can just come in to get this shot if it is ordered or does she need an appointment.  Patient call back number is [phone].

## 2018-08-22 ENCOUNTER — HOSPITAL ENCOUNTER (OUTPATIENT)
Dept: MAMMOGRAPHY | Facility: HOSPITAL | Age: 63
Discharge: HOME | End: 2018-08-22
Admitting: NURSE PRACTITIONER

## 2018-08-22 ENCOUNTER — TELEPHONE (OUTPATIENT)
Dept: INTERNAL MEDICINE | Facility: CLINIC | Age: 63
End: 2018-08-22

## 2018-08-22 DIAGNOSIS — Z12.31 SCREENING MAMMOGRAM, ENCOUNTER FOR: ICD-10-CM

## 2018-08-22 PROCEDURE — 77063 BREAST TOMOSYNTHESIS BI: CPT | Performed by: RADIOLOGY

## 2018-08-22 PROCEDURE — 77067 SCR MAMMO BI INCL CAD: CPT

## 2018-08-22 PROCEDURE — 77067 SCR MAMMO BI INCL CAD: CPT | Performed by: RADIOLOGY

## 2018-08-22 PROCEDURE — 77063 BREAST TOMOSYNTHESIS BI: CPT

## 2018-09-13 DIAGNOSIS — M62.838 MUSCLE SPASM: ICD-10-CM

## 2018-09-14 ENCOUNTER — TELEPHONE (OUTPATIENT)
Dept: INTERNAL MEDICINE | Facility: CLINIC | Age: 63
End: 2018-09-14

## 2018-09-14 RX ORDER — CYCLOBENZAPRINE HCL 10 MG
TABLET ORAL
Qty: 30 TABLET | Refills: 1 | Status: SHIPPED | OUTPATIENT
Start: 2018-09-14 | End: 2018-09-18 | Stop reason: SDUPTHER

## 2018-09-14 NOTE — TELEPHONE ENCOUNTER
----- Message from Jacki Reyes sent at 9/14/2018  9:06 AM EDT -----  [facility] [phone]    NEEDS CLARIFICATION ON CYCLOBENZAPRINE DIRECTIONS-WHAT IS THE MAX DAILY?

## 2018-09-18 DIAGNOSIS — M62.838 MUSCLE SPASM: ICD-10-CM

## 2018-09-18 RX ORDER — CYCLOBENZAPRINE HCL 10 MG
10 TABLET ORAL 3 TIMES DAILY PRN
Qty: 50 TABLET | Refills: 2 | Status: SHIPPED | OUTPATIENT
Start: 2018-09-18 | End: 2018-12-21

## 2018-09-18 NOTE — TELEPHONE ENCOUNTER
Patient was prescribed 10mg on 9/14/2018. SIG states: TAKE 1 TABLET BY MOUTH AS NEEDED FOR MUSCLE SPASM. Is patient to take TID PRN. Please advise/clarify

## 2018-09-24 ENCOUNTER — HOSPITAL ENCOUNTER (OUTPATIENT)
Dept: PHYSICAL THERAPY | Facility: HOSPITAL | Age: 63
Setting detail: THERAPIES SERIES
Discharge: HOME | End: 2018-09-24

## 2018-09-24 DIAGNOSIS — M54.50 MIDLINE LOW BACK PAIN WITHOUT SCIATICA, UNSPECIFIED CHRONICITY: Primary | ICD-10-CM

## 2018-09-24 PROCEDURE — 97110 THERAPEUTIC EXERCISES: CPT | Performed by: PHYSICAL THERAPIST

## 2018-09-24 PROCEDURE — 97161 PT EVAL LOW COMPLEX 20 MIN: CPT | Performed by: PHYSICAL THERAPIST

## 2018-09-24 NOTE — THERAPY EVALUATION
Outpatient Physical Therapy Ortho Initial Evaluation  [facility]     Patient Name: Livier Valerio  : 1955  MRN: [id number]  Today's Date: 2018      Visit Date: 2018    Patient Active Problem List   Diagnosis   • Muscle strain        Past Medical History:   Diagnosis Date   • Acid reflux    • Hyperlipidemia    • Sinusitis         History reviewed. No pertinent surgical history.    Visit Dx:     ICD-10-CM ICD-9-CM   1. 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 Pt noted mild back strain at the time of movement but had no pain until she got home. LBP increased throughout the evening and became severe in the next few days. Pt saw her physician who prescribed a muscle relaxer and prednisone and referred her to PT. Pt attempted to perform exercises from previous PT experience from years ago but had inc abdominal pain which she believes is from a hernia. Pt stated that current back pain is not as severe as the initial few days after injury but hurts with change in position and can spread throughout her low back.   -LS      Previous treatment for THIS PROBLEM Medication   Prednisone, muscle relaxer   -LS      Current Tobacco Use none  -LS      Smoking Status none  -LS      Patient's Rating of General Health Very good  -LS      Hand Dominance right-handed  -LS      How has patient tried to help current problem? Icy hot, heat pads, ibuprofen, muscle relaxer, prednisone  -LS         Pain     Pain Location Back  -LS      Pain at Present 3  -LS      Pain at Best 0  -LS      Pain at Worst 10  -LS      Pain Frequency Constant/continuous  -LS      Pain Description Throbbing;Tightness   -LS      What Performance Factors Make the Current Problem(s) WORSE? Standing, lifting heavy objects, trunk rotations  -LS      What Performance Factors Make the Current Problem(s) BETTER? Sitting bent over, sidelying   -LS      Is your sleep disturbed? Yes  -LS      Is medication used to assist with sleep? No  -LS      What position do you sleep in? Left sidelying;Right sidelying  -LS      Difficulties with ADL's? Lifting heavy objects, vacuuming   -LS      Difficulties with recreational activities? Recreational walking   -LS         Fall Risk Assessment    Any falls in the past year: No  -LS         Daily Activities    Primary Language English  -LS      Are you able to read Yes  -LS      Are you able to write Yes  -LS      How does patient learn best? Demonstration  -LS      Teaching needs identified Home Exercise Program;Management of Condition  -LS      Patient is concerned about/has problems with Performing home management (household chores, shopping, care of dependents);Performing job responsibilities/community activities (work, school,  -LS      Does patient have problems with the following? Panic Attack;Anxiety  -LS      Barriers to learning None  -LS      Pt Participated in POC and Goals Yes  -LS         Safety    Are you being hurt, hit, or frightened by anyone at home or in your life? No  -LS      Are you being neglected by a caregiver No  -LS        User Key  (r) = Recorded By, (t) = Taken By, (c) = Cosigned By    Initials Name Provider Type    Eric Monteiro PT Physical Therapist                PT Ortho     Row Name 09/24/18 1300       Precautions and Contraindications    Precautions/Limitations no known precautions/limitations  -LS       Posture/Observations    Alignment Options Lumbar lordosis;Rounded shoulders  -LS    Rounded Shoulders Mild;Bilateral:  -LS    Lumbar lordosis Increased;Mild  -LS    Posture/Observations Comments Decreased multifidus and lumbar paraspinal mm activation and  coordination with prone hip extension. R worse than L.  -LS       Quarter Clearing    Quarter Clearing Lower Quarter Clearing  -LS       DTR- Lower Quarter Clearing    Patellar tendon (L2-4) Bilateral:;2- Normal response  -LS    Achilles tendon (S1-2) Bilateral:;2- Normal response  -LS       SI/Hip Screen- Lower Quarter Clearing    Hugo's/Domingo's test Bilateral:;Negative  -LS    Posterior thigh sheer Bilateral:;Negative  -LS       Special Tests/Palpation    Special Tests/Palpation Lumbar/SI  -LS       Lumbosacral Accessory Motions    Lumbosacral Accessory Motions Tested? Yes  -LS    PA Glide- L1 WNL  -LS    PA [place]- L2 WNL  -LS    PA [place]- L3 WNL  -LS    PA [place]- L4 WNL  -LS    PA [place]- L5 WNL  -LS    PA glide- Sacral base WNL  -LS    PA glide- Sacral apex WNL  -LS       Lumbosacral Palpation    Lumbosacral Palpation? Yes  -LS    Erector Spinae (Paraspinals) Bilateral:;Tender   No other TTP noted   -LS       General ROM    Head/Neck/Trunk Trunk Extension;Trunk Flexion;Trunk Rt Lateral Flexion;Trunk Lt Lateral Flexion  -LS    RT Lower Ext Rt Hip External Rotation;Rt Hip Internal Rotation  -LS    LT Lower Ext Lt Hip External Rotation;Lt Hip Internal Rotation  -LS       Head/Neck/Trunk    Trunk Extension AROM 100%  -LS    Trunk Flexion AROM 8 cm  -LS    Trunk Lt Lateral Flexion AROM KJL  -LS    Trunk Rt Lateral Flexion AROM KJL  -LS       Right Lower Ext    Rt Hip External Rotation AROM 50  -LS    Rt Hip Internal Rotation AROM 40  -LS       Left Lower Ext    Lt Hip External Rotation AROM 55  -LS    Lt Hip Internal Rotation AROM 40  -LS       MMT (Manual Muscle Testing)    Rt Lower Ext Rt Hip Flexion;Rt Hip ABduction;Rt Hip Extension;Rt Knee Extension;Rt Knee Flexion;Rt Ankle Plantarflexion;Rt Ankle Dorsiflexion;Rt Ankle Subtalar Inversion;Rt Ankle Subtalar Eversion;Rt MTP Extension;Rt Hip Internal (Medial) Rotation;Rt Hip External (Lateral) Rotation  -LS    Lt Lower Ext Lt Hip Flexion;Lt Hip Extension;Lt Hip  ABduction;Lt Knee Extension;Lt Hip Internal (Medial) Rotation;Lt Hip External (Lateral) Rotation;Lt Knee Flexion;Lt Ankle Plantarflexion;Lt Ankle Dorsiflexion;Lt Ankle Subtalar Inversion;Lt Ankle Subtalar Eversion;Lt MTP Extension  -LS       MMT Right Lower Ext    Rt Hip Flexion MMT, Gross Movement (5/5) normal  -LS    Rt Hip Extension MMT, Gross Movement (4-/5) good minus  -LS    Rt Hip ABduction MMT, Gross Movement (4/5) good  -LS    Rt Hip Internal (Medial) Rotation MMT, Gross Movement (5/5) normal  -LS    Rt Hip External (Lateral) Rotation MMT, Gross Movement (5/5) normal  -LS    Rt Knee Extension MMT, Gross Movement (5/5) normal  -LS    Rt Knee Flexion MMT, Gross Movement (5/5) normal  -LS    Rt Ankle Plantarflexion MMT, Gross Movement (5/5) normal  -LS    Rt Ankle Dorsiflexion MMT, Gross Movement (5/5) normal  -LS    Rt Ankle Subtalar Inversion MT, Gross Movement (5/5) normal  -LS    Rt Ankle Subtalar Eversion MMT, Gross Movement (5/5) normal  -LS    Rt MTP Extension MMT, Gross Movement (5/5) normal  -LS       MMT Left Lower Ext    Lt Hip Flexion MMT, Gross Movement (4/5) good  -LS    Lt Hip Extension MMT, Gross Movement (4-/5) good minus  -LS    Lt Hip ABduction MMT, Gross Movement (4-/5) good minus  -LS    Lt Hip Internal (Medial) Rotation MMT, Gross Movement (4-/5) good minus  -LS    Lt Hip External (Lateral) Rotation MMT, Gross Movement (5/5) normal  -LS    Lt Knee Extension MMT, Gross Movement (5/5) normal  -LS    Lt Knee Flexion MMT, Gross Movement (5/5) normal  -LS    Lt Ankle Plantarflexion MMT, Gross Movement (5/5) normal  -LS    Lt Ankle Dorsiflexion MMT, Gross Movement (5/5) normal  -LS    Lt Ankle Subtalar Inversion MMT, Gross Movement (5/5) normal  -LS    Lt Ankle Subtalar Eversion MMT, Gross Movement (5/5) normal  -LS    Lt MTP Extension MMT, Gross Movement (5/5) normal  -LS       Flexibility    Flexibility Tested? Lower Extremity  -LS       Lower Extremity Flexibility    Hamstrings  Bilateral:;WNL  -LS       Pathomechanics    Pathomechanics Comments Significant anterior knee position with functional squat. Poor anterior trunk lean with sit to stand.   -LS      User Key  (r) = Recorded By, (t) = Taken By, (c) = Cosigned By    Initials Name Provider Type    Eric Monteiro, PT Physical Therapist                      Therapy Education  Education Details: HEP prescribed and reviewed. Pt educated on the importance of mobility and regular exercise. Educated on proper sit to stand technique.  Given: HEP, Pain management, Symptoms/condition management, Posture/body mechanics  Program: New  How Provided: Verbal, Demonstration, Written  Provided to: Patient  Level of Understanding: Teach back education performed, Verbalized, Demonstrated           PT OP Goals     Row Name 09/24/18 1300          PT Short Term Goals    STG Date to Achieve 10/22/18  -LS     STG 1 Pt will be independent and compliant with HEP.   -LS     STG 1 Progress New  -LS     STG 2 Pt will decrease complaints of pain to 2/10 at rest, 5/10 with activity.  -LS     STG 2 Progress New  -LS     STG 3 Pt will perform functional squats with appropriate knee and foot position and no provocation of low back pain.   -LS     STG 3 Progress New  -LS     STG 4 Pt will demonstrate B LE MMT hip abd 4/5, hip ext 4/5, hip IR 5/5  -LS     STG 4 Progress New  -LS        Long Term Goals    LTG Date to Achieve 11/19/18  -LS     LTG 1 Pt will be independent and compliant with HEP and self-maintenance of condition.  -LS     LTG 1 Progress New  -LS     LTG 2 Pt will decrease complaints of pain to 0/10 at rest, 2/10 with activity.  -LS     LTG 2 Progress New  -LS     LTG 3 Pt will demonstrate B LE MMT hip abd 5/5, hip ext 5/5, hip IR 5/5  -LS     LTG 3 Progress New  -LS     LTG 4 Pt will perform all ADLs with no limitations.   -LS     LTG 4 Progress New  -LS     LTG 5 Pt will return to recreational activities with no limitations.    -LS     LTG 5 Progress New   -LS        Time Calculation    PT Goal Re-Cert Due Date 12/23/18  -LS       User Key  (r) = Recorded By, (t) = Taken By, (c) = Cosigned By    Initials Name Provider Type    LS Eric Desouza, PT Physical Therapist                PT Assessment/Plan     Row Name 09/24/18 3543          PT Assessment    Functional Limitations Limitation in home management;Limitations in community activities;Limitations in functional capacity and performance  -LS     Impairments Pain;Muscle strength;Poor body mechanics;Coordination;Motor function  -LS     Assessment Comments Pt is a 64 yo F who presented with signs and symptoms consistent with muscle strain and muscular incoordination of lumbar paraspinals. Pt demonstrated normal mm tone of B paraspinals with mild tightness on the L but no apparent TTP. She displayed poor muscle activation of multifidi and paraspinals with hip extension in supine indicative of incoordination leading to decreased stability around the lumbar spine vertebrae. Pt had back pain with prone press ups, reduced with manual overpressure to L3-5, indicating she will likely benefit from stabilization exercises. She had B hip mm weakness and poor squat mechanics that may contribute to back pain. Pt will likely benefit from skilled PT intervention to improve mm stability of spine, reduce pain, and improve function.   -LS     Please refer to paper survey for additional self-reported information Yes  -LS     Rehab Potential Excellent  -LS     Patient/caregiver participated in establishment of treatment plan and goals Yes  -LS     Patient would benefit from skilled therapy intervention Yes  -LS        PT Plan    PT Frequency 2x/week  -LS     Predicted Duration of Therapy Intervention (Therapy Eval) 8-12 visits  -LS     Planned CPT's? PT EVAL LOW COMPLEXITY: 86941;PT RE-EVAL: 85301;PT MANUAL THERAPY EA 15 MIN: 37962;PT THER ACT EA 15 MIN: 09593;PT THER PROC EA 15 MIN: 48322;PT TRACTION LUMBAR: 36428;PT ELECTRICAL STIM  UNATTEND: ;PT IONTOPHORESIS EA 15 MIN: 45738  -LS     PT Plan Comments Ther ex for core stability and B hip strengthening. MT for reduction of pain.  -LS       User Key  (r) = Recorded By, (t) = Taken By, (c) = Cosigned By    Initials Name Provider Type    LS Eric Desouza PT Physical Therapist                  Exercises     Row Name 09/24/18 1300             Total Minutes    22101 - PT Therapeutic Exercise Minutes 15  -LS         Exercise 1    Exercise Name 1 HEP per external documentation prescribed with emphasis on core stability and B hip strength.   -LS        User Key  (r) = Recorded By, (t) = Taken By, (c) = Cosigned By    Initials Name Provider Type    Eric Monteiro PT Physical Therapist                        Outcome Measure Options: Modifed Suzieestry  Modified Oswestry  Modified Oswestry Score/Comments: 29      Time Calculation:     Therapy Suggested Charges     Code   Minutes Charges    35116 (CPT®) Hc Pt Neuromusc Re Education Ea 15 Min      70085 (CPT®) Hc Pt Ther Proc Ea 15 Min 15 1    59399 (CPT®) Hc Gait Training Ea 15 Min      03220 (CPT®) Hc Pt Therapeutic Act Ea 15 Min      19445 (CPT®) Hc Pt Manual Therapy Ea 15 Min      45309 (CPT®) Hc Pt Ther Massage- Per 15 Min      89267 (CPT®) Hc Pt Iontophoresis Ea 15 Min      21736 (CPT®) Hc Pt Elec Stim Ea-Per 15 Min      05696 (CPT®) Hc Pt Ultrasound Ea 15 Min      73118 (CPT®) Hc Pt Self Care/Mgmt/Train Ea 15 Min      48411 (CPT®) Hc Pt Prosthetic (S) Train Initial Encounter, Each 15 Min      50616 (CPT®) Hc Orthotic(S) Mgmt/Train Initial Encounter, Each 15min      95495 (CPT®) Hc Pt Aquatic Therapy Ea 15 Min      89181 (CPT®) Hc Pt Orthotic(S)/Prosthetic(S) Encounter, Each 15 Min      Total  15 1          Start Time: 1345     Therapy Charges for Today     Code Description Service Date Service Provider Modifiers Qty    83012725984 HC PT THER PROC EA 15 MIN 9/24/2018 Eric Desouza, PT GP 1    96154201399 HC PT EVAL LOW COMPLEXITY 2 9/24/2018 Deo  Eric, PT GP 1          PT G-Codes  Outcome Measure Options: Modifed Owestry  Modified Oswestry Score/Comments: 29         Eric Desouza, PT  9/24/2018

## 2018-10-02 ENCOUNTER — HOSPITAL ENCOUNTER (OUTPATIENT)
Dept: PHYSICAL THERAPY | Facility: HOSPITAL | Age: 63
Setting detail: THERAPIES SERIES
Discharge: HOME | End: 2018-10-02

## 2018-10-02 DIAGNOSIS — M54.50 MIDLINE LOW BACK PAIN WITHOUT SCIATICA, UNSPECIFIED CHRONICITY: Primary | ICD-10-CM

## 2018-10-02 PROCEDURE — 97140 MANUAL THERAPY 1/> REGIONS: CPT | Performed by: PHYSICAL THERAPIST

## 2018-10-02 PROCEDURE — 97032 APPL MODALITY 1+ESTIM EA 15: CPT | Performed by: PHYSICAL THERAPIST

## 2018-10-02 NOTE — THERAPY TREATMENT NOTE
Outpatient Physical Therapy Ortho Treatment Note  [facility]     Patient Name: Livier Valerio  : 1955  MRN: [id number]  Today's Date: 10/2/2018      Visit Date: 10/02/2018    Visit Dx:    ICD-10-CM ICD-9-CM   1. Midline low back pain without sciatica, unspecified chronicity M54.5 724.2       Patient Active Problem List   Diagnosis   • Muscle strain        Past Medical History:   Diagnosis Date   • Acid reflux    • Hyperlipidemia    • Sinusitis         No past surgical history on file.          PT Ortho     Row Name 10/02/18 1500       Subjective Comments    Subjective Comments Pt stated that her back pain had significantly inc since her last visit, with severe mid-back pain beginning the day following IE. She stated that she did not have much pain the night of her IE, but that pain was severe the next day. She could not recall any aggravating injury that would have resulted in the pain inc. She stated that sit <> stands prescribed in the HEP increased pain due to the forward reach of the UEs. She stated that sneezing and coughing led to significant inc in back pain and became intolerable. She stated she had tried to relieve pain with ibuprofen, ice, and heat, but had not gotten much relief.   -LS       Precautions and Contraindications    Precautions/Limitations no known precautions/limitations  -LS       Special Tests/Palpation    Special Tests/Palpation Cervical/Thoracic  -LS       Cervical Palpation    Cervical Palpation- Location? Ribs   Thoracic paraspinals significantly guarded and TTP; R > L  -LS    Ribs Right:;Elicits spasm;Trigger point;Tender;Guarded/taut   Intercostals of rib 7/8 with significant TTP and spasm  -LS       Rib Mobility    Rib Mobility Accessory Motions Tested? Yes  -LS    Rib Mobility- T7 WNL;Right pain  -LS    Rib Mobility- T8 WNL;Right pain  -LS    Rib Mobility- T9 WNL  -LS       Lumbosacral Palpation    Erector Spinae (Paraspinals) Bilateral:;Tender  -LS      User Key  (r) =  Recorded By, (t) = Taken By, (c) = Cosigned By    Initials Name Provider Type    Eric Monteiro, PT Physical Therapist                            PT Assessment/Plan     Row Name 10/02/18 1500          PT Assessment    Assessment Comments Pt presented with significant inc in back pain, particularly confined to intercostal mm between ribs 8-9. Intercostals were severely TTP and elicited spasms with pressure. She had mild inc in pain with deep breathing, noted mostly with inhalation but present with exhalation as well. She likely has acute spasms of intercostal mm leading to R paraspinal mm guarding and resulting in significant tightness and pain. She tolerated MT well, though inc in pain was noted throughout rib 8-9 intercostals. IFC and rib mobs were attempted to reduce pain and pt had no present pain following tx. She was advised to call if pain significantly inc following tx or in the next few days.   -LS        PT Plan    PT Plan Comments Monitor change in sx moving forward and resume ther ex when appropriate. MT and modalities for reduction of pain.  -LS       User Key  (r) = Recorded By, (t) = Taken By, (c) = Cosigned By    Initials Name Provider Type    Eric Monteiro, PT Physical Therapist                Modalities     Row Name 10/02/18 1500             ELECTRICAL STIMULATION    Attended/Unattended Unattended  -LS      Stimulation Type IFC  -LS      Max mAmp 6.5  -LS      Location/Electrode Placement/Other R paraspinals T4-10  -LS      88635 - PT Electrical Stimulation Attended (Manual) Minutes 15  -LS        User Key  (r) = Recorded By, (t) = Taken By, (c) = Cosigned By    Initials Name Provider Type    Eric Monteiro PT Physical Therapist                Exercises     Row Name 10/02/18 1500             Subjective Comments    Subjective Comments Pt stated that her back pain had significantly inc since her last visit, with severe mid-back pain beginning the day following IE. She stated that she did not have  much pain the night of her IE, but that pain was severe the next day. She could not recall any aggravating injury that would have resulted in the pain inc. She stated that sit <> stands prescribed in the HEP increased pain due to the forward reach of the UEs. She stated that sneezing and coughing led to significant inc in back pain and became intolerable. She stated she had tried to relieve pain with ibuprofen, ice, and heat, but had not gotten much relief.   -LS         Subjective Pain    Able to rate subjective pain? yes  -LS      Pre-Treatment Pain Level 10  -LS      Post-Treatment Pain Level 0  -LS      Subjective Pain Comment Pain has been intermittent; no present pain following tx.  -LS         Total Minutes    21320 - PT Therapeutic Exercise Minutes 5  -LS      69674 - PT Manual Therapy Minutes 30  -LS         Exercise 1    Exercise Name 1 Recumbent bike   -LS      Time 1 5 min  -LS        User Key  (r) = Recorded By, (t) = Taken By, (c) = Cosigned By    Initials Name Provider Type    Eric Monteiro PT Physical Therapist                        Manual Rx (last 36 hours)      Manual Treatments     Row Name 10/02/18 1500             Total Minutes    68200 - PT Manual Therapy Minutes 30  -LS         Manual Rx 1    Manual Rx 1 Location R thoracic paraspinals, intercostals rib 7/8  -LS      Manual Rx 1 Type STM  -LS         Manual Rx 2    Manual Rx 2 Location T6-8 SP  -LS      Manual Rx 2 Type PA glides   -LS      Manual Rx 2 Grade 2/3  -LS         Manual Rx 3    Manual Rx 3 Location Rib 7-9  -LS      Manual Rx 3 Type Ant/post, sup/inf mobs   -LS      Manual Rx 3 Grade 3  -LS        User Key  (r) = Recorded By, (t) = Taken By, (c) = Cosigned By    Initials Name Provider Type    Eric Monteiro PT Physical Therapist              Therapy Education  Education Details: Pt advised to discontinue HEP with exception of PPT due to inc in pain. Advised on stretching and breathing exercises to encourage rib  mobility.  Given: HEP, Symptoms/condition management, Pain management, Posture/body mechanics  Program: New  How Provided: Verbal, Demonstration  Provided to: Patient  Level of Understanding: Teach back education performed, Verbalized, Demonstrated              Time Calculation:   Start Time: 1350  Therapy Suggested Charges     Code   Minutes Charges    87730 (CPT®) Hc Pt Neuromusc Re Education Ea 15 Min      94831 (CPT®) Hc Pt Ther Proc Ea 15 Min 5     77071 (CPT®) Hc Gait Training Ea 15 Min      40736 (CPT®) Hc Pt Therapeutic Act Ea 15 Min      12905 (CPT®) Hc Pt Manual Therapy Ea 15 Min 30 2    46659 (CPT®) Hc Pt Ther Massage- Per 15 Min      50240 (CPT®) Hc Pt Iontophoresis Ea 15 Min      74939 (CPT®) Hc Pt Elec Stim Ea-Per 15 Min 15 1    86148 (CPT®) Hc Pt Ultrasound Ea 15 Min      28052 (CPT®) Hc Pt Self Care/Mgmt/Train Ea 15 Min      70233 (CPT®) Hc Pt Prosthetic (S) Train Initial Encounter, Each 15 Min      65336 (CPT®) Hc Orthotic(S) Mgmt/Train Initial Encounter, Each 15min      13481 (CPT®) Hc Pt Aquatic Therapy Ea 15 Min      30597 (CPT®) Hc Pt Orthotic(S)/Prosthetic(S) Encounter, Each 15 Min       (CPT®) Hc Pt Electrical Stim Unattended      Total  50 3        Therapy Charges for Today     Code Description Service Date Service Provider Modifiers Qty    91295472125 HC PT MANUAL THERAPY EA 15 MIN 10/2/2018 Eric Desouza, PT GP 2    21937194229 HC PT ELEC STIM EA-PER 15 MIN 10/2/2018 Eric Desouza, PT GP 1                    Eric Desouza PT  10/2/2018

## 2018-10-04 ENCOUNTER — HOSPITAL ENCOUNTER (OUTPATIENT)
Dept: PHYSICAL THERAPY | Facility: HOSPITAL | Age: 63
Setting detail: THERAPIES SERIES
Discharge: HOME | End: 2018-10-04

## 2018-10-04 DIAGNOSIS — M54.50 MIDLINE LOW BACK PAIN WITHOUT SCIATICA, UNSPECIFIED CHRONICITY: Primary | ICD-10-CM

## 2018-10-04 PROCEDURE — 97032 APPL MODALITY 1+ESTIM EA 15: CPT | Performed by: PHYSICAL THERAPIST

## 2018-10-04 PROCEDURE — 97110 THERAPEUTIC EXERCISES: CPT | Performed by: PHYSICAL THERAPIST

## 2018-10-04 PROCEDURE — 97140 MANUAL THERAPY 1/> REGIONS: CPT | Performed by: PHYSICAL THERAPIST

## 2018-10-04 NOTE — TELEPHONE ENCOUNTER
"I received fax from [facility] notifying me of approval for cologuard test with reimbursement notice of 100%. Per fax, pt will not be \"balance billed\" reference # [id number]  I called pt and LVM for her to return call to inform her of this.   I placed this in Tessie's box in case you need info from this for anything. If you don't need this, I guess it can just be scanned in pts chart  "

## 2018-10-04 NOTE — THERAPY TREATMENT NOTE
Outpatient Physical Therapy Ortho Treatment Note  [facility]     Patient Name: Livier Valerio  : 1955  MRN: [id number]  Today's Date: 10/4/2018      Visit Date: 10/04/2018    Visit Dx:    ICD-10-CM ICD-9-CM   1. Midline low back pain without sciatica, unspecified chronicity M54.5 724.2       Patient Active Problem List   Diagnosis   • Muscle strain        Past Medical History:   Diagnosis Date   • Acid reflux    • Hyperlipidemia    • Sinusitis         No past surgical history on file.          PT Ortho     Row Name 10/04/18 1300       Subjective Comments    Subjective Comments Pt stated that her back pain has decreased since her last visit, but continues to persist with sneezing, couging, and turning. She reported that discontinuing the HEP has relieved pain. She reported lasting reduction of pain from e-stim during her last visit. She reported she is hesitant to return to exercise in fear that she will further provoke pain.  -LS       Precautions and Contraindications    Precautions/Limitations no known precautions/limitations  -LS       Subjective Pain    Able to rate subjective pain? yes  -LS    Pre-Treatment Pain Level 5  -LS       Cervical Palpation    Ribs Right:;Elicits spasm;Trigger point;Tender;Guarded/taut   intercostals of 8/9 with spasm and guarding   -LS       Rib Mobility    Rib Mobility- T7 WNL;Right pain  -LS    Rib Mobility- T8 WNL;Right pain  -LS    Rib Mobility- T9 WNL;Right pain  -LS       Lumbosacral Palpation    Erector Spinae (Paraspinals) Bilateral:;Tender  -LS    Row Name 10/02/18 1500       Subjective Comments    Subjective Comments Pt stated that her back pain had significantly inc since her last visit, with severe mid-back pain beginning the day following IE. She stated that she did not have much pain the night of her IE, but that pain was severe the next day. She could not recall any aggravating injury that would have resulted in the pain inc. She stated that sit <> stands  prescribed in the HEP increased pain due to the forward reach of the UEs. She stated that sneezing and coughing led to significant inc in back pain and became intolerable. She stated she had tried to relieve pain with ibuprofen, ice, and heat, but had not gotten much relief.   -LS       Precautions and Contraindications    Precautions/Limitations no known precautions/limitations  -LS       Special Tests/Palpation    Special Tests/Palpation Cervical/Thoracic  -LS       Cervical Palpation    Cervical Palpation- Location? Ribs   Thoracic paraspinals significantly guarded and TTP; R > L  -LS    Ribs Right:;Elicits spasm;Trigger point;Tender;Guarded/taut   Intercostals of rib 7/8 with significant TTP and spasm  -LS       Rib Mobility    Rib Mobility Accessory Motions Tested? Yes  -LS    Rib Mobility- T7 WNL;Right pain  -LS    Rib Mobility- T8 WNL;Right pain  -LS    Rib Mobility- T9 WNL  -LS       Lumbosacral Palpation    Erector Spinae (Paraspinals) Bilateral:;Tender  -LS      User Key  (r) = Recorded By, (t) = Taken By, (c) = Cosigned By    Initials Name Provider Type    LS Eric Desouza, PT Physical Therapist                            PT Assessment/Plan     Row Name 10/04/18 1300          PT Assessment    Assessment Comments Pt presented with moderate decrease in back pain as compared to last visit. She continued to demonstrate TTP and muscle tension along the intercostals of ribs 8/9, though spasms were less frequent and severe. She performed exercises per external flowsheet with no complaints of pain, though she was reluctant to perform them due to fear of increasing pain. She tolerated IFC very well and noted decreased sx immediately following procedure. She was educated on use of home TENS unit. It is likely that pain is due to acute injury to intercostals and will likely heal within a couple of weeks with gentle stretching, MT, and modalities.   -LS        PT Plan    PT Plan Comments Continue low grade exercises  and stretches to encourage rib and spine mobility. Modalities and MT for pain reduction.  -LS       User Key  (r) = Recorded By, (t) = Taken By, (c) = Cosigned By    Initials Name Provider Type    Eric Monteiro PT Physical Therapist                Modalities     Row Name 10/04/18 1300             Subjective Pain    Post-Treatment Pain Level 2  -LS         ELECTRICAL STIMULATION    Attended/Unattended Unattended  -LS      Stimulation Type IFC  -LS      Max mAmp 6.5  -LS      Location/Electrode Placement/Other R paraspinals T8-L2  -LS      02115 - PT Electrical Stimulation Attended (Manual) Minutes 15  -LS        User Key  (r) = Recorded By, (t) = Taken By, (c) = Cosigned By    Initials Name Provider Type    Eric Monteiro PT Physical Therapist                Exercises     Row Name 10/04/18 1300             Subjective Comments    Subjective Comments Pt stated that her back pain has decreased since her last visit, but continues to persist with sneezing, couging, and turning. She reported that discontinuing the HEP has relieved pain. She reported lasting reduction of pain from e-stim during her last visit. She reported she is hesitant to return to exercise in fear that she will further provoke pain.  -LS         Subjective Pain    Able to rate subjective pain? yes  -LS      Pre-Treatment Pain Level 5  -LS      Post-Treatment Pain Level 2  -LS         Total Minutes    05239 - PT Therapeutic Exercise Minutes 10  -LS      97633 - PT Manual Therapy Minutes 15  -LS         Exercise 1    Exercise Name 1 Exercises per external flowsheet. Rib mobility emphasis.  -LS        User Key  (r) = Recorded By, (t) = Taken By, (c) = Cosigned By    Initials Name Provider Type    Eric Monteiro PT Physical Therapist                        Manual Rx (last 36 hours)      Manual Treatments     Row Name 10/04/18 1300             Total Minutes    33660 - PT Manual Therapy Minutes 15  -LS         Manual Rx 1    Manual Rx 1 Location R  thoracic paraspinals, intercostals rib 8/9/10  -LS      Manual Rx 1 Type STM  -LS         Manual Rx 2    Manual Rx 2 Location T8-10  -LS      Manual Rx 2 Type PA glides   -LS      Manual Rx 2 Grade 2/3  -LS         Manual Rx 3    Manual Rx 3 Location Rib 8-10  -LS      Manual Rx 3 Type Ant/post, sup/inf mobs   -LS      Manual Rx 3 Grade 3  -LS        User Key  (r) = Recorded By, (t) = Taken By, (c) = Cosigned By    Initials Name Provider Type    Eric Monteiro PT Physical Therapist              Therapy Education  Education Details: Educated on proper use of home TENS unit. Discussed diaphragmatic breathing techniques and stretches.   Given: HEP, Symptoms/condition management, Pain management  Program: New  How Provided: Verbal, Demonstration  Provided to: Patient  Level of Understanding: Verbalized, Demonstrated, Teach back education performed              Time Calculation:   Start Time: 1345  Therapy Suggested Charges     Code   Minutes Charges    59258 (CPT®) Hc Pt Neuromusc Re Education Ea 15 Min      25491 (CPT®) Hc Pt Ther Proc Ea 15 Min 10 1    69084 (CPT®) Hc Gait Training Ea 15 Min      80338 (CPT®) Hc Pt Therapeutic Act Ea 15 Min      19894 (CPT®) Hc Pt Manual Therapy Ea 15 Min 15 1    36718 (CPT®) Hc Pt Ther Massage- Per 15 Min      40635 (CPT®) Hc Pt Iontophoresis Ea 15 Min      60117 (CPT®) Hc Pt Elec Stim Ea-Per 15 Min 15 1    05189 (CPT®) Hc Pt Ultrasound Ea 15 Min      21324 (CPT®) Hc Pt Self Care/Mgmt/Train Ea 15 Min      75830 (CPT®) Hc Pt Prosthetic (S) Train Initial Encounter, Each 15 Min      89016 (CPT®) Hc Orthotic(S) Mgmt/Train Initial Encounter, Each 15min      07314 (CPT®) Hc Pt Aquatic Therapy Ea 15 Min      06930 (CPT®) Hc Pt Orthotic(S)/Prosthetic(S) Encounter, Each 15 Min       (CPT®) Hc Pt Electrical Stim Unattended      Total  40 3        Therapy Charges for Today     Code Description Service Date Service Provider Modifiers Qty    90469597150 HC PT THER PROC EA 15 MIN 10/4/2018  Eric Desouza, PT GP 1    79027627835 HC PT MANUAL THERAPY EA 15 MIN 10/4/2018 Eric Desouza, PT GP 1    90975576129 HC PT ELEC STIM EA-PER 15 MIN 10/4/2018 Eric Desouza, PT GP 1                    Eric Desouza, PT  10/4/2018

## 2018-10-09 NOTE — TELEPHONE ENCOUNTER
S/W patient and informed her that [employer] received the approval for her cologuard test.  Authorization # [id number] was given to the patient.  Informed patient to contact [employer] and give them the authorization number so they can refile the claim.  Patient verbalized understanding and appreciation.

## 2018-10-12 ENCOUNTER — HOSPITAL ENCOUNTER (OUTPATIENT)
Dept: PHYSICAL THERAPY | Facility: HOSPITAL | Age: 63
Setting detail: THERAPIES SERIES
Discharge: HOME | End: 2018-10-12

## 2018-10-12 DIAGNOSIS — M54.50 MIDLINE LOW BACK PAIN WITHOUT SCIATICA, UNSPECIFIED CHRONICITY: Primary | ICD-10-CM

## 2018-10-12 PROCEDURE — 97032 APPL MODALITY 1+ESTIM EA 15: CPT | Performed by: PHYSICAL THERAPIST

## 2018-10-12 PROCEDURE — 97140 MANUAL THERAPY 1/> REGIONS: CPT | Performed by: PHYSICAL THERAPIST

## 2018-10-12 PROCEDURE — 97110 THERAPEUTIC EXERCISES: CPT | Performed by: PHYSICAL THERAPIST

## 2018-10-12 NOTE — THERAPY TREATMENT NOTE
Outpatient Physical Therapy Ortho Treatment Note  [facility]     Patient Name: Livier Valerio  : 1955  MRN: [id number]  Today's Date: 10/12/2018      Visit Date: 10/12/2018    Visit Dx:    ICD-10-CM ICD-9-CM   1. Midline low back pain without sciatica, unspecified chronicity M54.5 724.2       Patient Active Problem List   Diagnosis   • Muscle strain        Past Medical History:   Diagnosis Date   • Acid reflux    • Hyperlipidemia    • Sinusitis         No past surgical history on file.          PT Ortho     Row Name 10/12/18 1600       Subjective Comments    Subjective Comments Pt stated that her intercostal pain had subsided and that the majority of her pain had returned to her lower back. She stated she had not been performing her HEP. She made frequent complaints about the comfort of the treatment table and difficulty of the exercises, despite exercises being very low level lumbar mobility activities. She stated that estim and heat seemed to give the greatest relief.   -LS       Precautions and Contraindications    Precautions/Limitations no known precautions/limitations  -LS       Subjective Pain    Able to rate subjective pain? yes  -LS    Pre-Treatment Pain Level 3  -LS    Post-Treatment Pain Level 0  -LS       Cervical Palpation    Ribs --   No TTP  -LS       Lumbosacral Palpation    Erector Spinae (Paraspinals) Bilateral:;Tender;Elicits spasm  -LS      User Key  (r) = Recorded By, (t) = Taken By, (c) = Cosigned By    Initials Name Provider Type    Eric Monteiro PT Physical Therapist                            PT Assessment/Plan     Row Name 10/12/18 1600          PT Assessment    Assessment Comments Pt presented with reduction of intercostal pain that had bothered her the last couple of weeks. Interventions were focused on the management of lumbar paraspinal muscle tightness and hypomobile segments. Manual therapy was tolerated well with no complaints of pain, though pt stated that she had  back soreness following intervention. She did not tolerate supine or hooklying positions on the plinth well and continuously noted that it was not a comfortable table. She also made frequent comments suggesting that exercises were difficult, despite their very low complexity and intensity. Pt requested heat and estim and stated she had no pain following tx. Low back pain appears to be very irritable and pt does not seem on board with exercise interventions.   -LS        PT Plan    PT Plan Comments Exercises to encourage spinal mobility and muscular endurance. Education on the benefits of exercise.   -LS       User Key  (r) = Recorded By, (t) = Taken By, (c) = Cosigned By    Initials Name Provider Type    Eric Monteiro, PT Physical Therapist                Modalities     Row Name 10/12/18 1600             Moist Heat    MH Applied Yes  -LS      Location Lumbar spine  -LS      Rx Minutes 10 mins  -LS      MH Prior to Rx No  -LS      MH S/P Rx Yes  -LS         ELECTRICAL STIMULATION    Attended/Unattended Unattended  -LS      Stimulation Type IFC  -LS      Max mAmp 6.5  -LS      Location/Electrode Placement/Other B lumbar paraspinals   -LS      29054 - PT Electrical Stimulation Attended (Manual) Minutes 10  -LS        User Key  (r) = Recorded By, (t) = Taken By, (c) = Cosigned By    Initials Name Provider Type    Eric Monteiro, PT Physical Therapist                Exercises     Row Name 10/12/18 1600             Subjective Comments    Subjective Comments Pt stated that her intercostal pain had subsided and that the majority of her pain had returned to her lower back. She stated she had not been performing her HEP. She made frequent complaints about the comfort of the treatment table and difficulty of the exercises, despite exercises being very low level lumbar mobility activities. She stated that estim and heat seemed to give the greatest relief.   -LS         Subjective Pain    Able to rate subjective pain? yes   -LS      Pre-Treatment Pain Level 3  -LS      Post-Treatment Pain Level 0  -LS         Total Minutes    47922 - PT Therapeutic Exercise Minutes 15  -LS      13035 - PT Manual Therapy Minutes 15  -LS         Exercise 1    Exercise Name 1 Exercises performed per external documentation with emphasis on low level lumbar mobility and core strengthening.  -LS        User Key  (r) = Recorded By, (t) = Taken By, (c) = Cosigned By    Initials Name Provider Type    LS Eric Desouza PT Physical Therapist                        Manual Rx (last 36 hours)      Manual Treatments     Row Name 10/12/18 1600             Total Minutes    63073 - PT Manual Therapy Minutes 15  -LS         Manual Rx 1    Manual Rx 1 Location B lumbar paraspinals   -LS      Manual Rx 1 Type STM   -LS         Manual Rx 2    Manual Rx 2 Location L1-5  -LS      Manual Rx 2 Type PA glides   -LS      Manual Rx 2 Grade 2/3  -LS        User Key  (r) = Recorded By, (t) = Taken By, (c) = Cosigned By    Initials Name Provider Type    LS Eric Desouza PT Physical Therapist                             Time Calculation:   Start Time: 1345  Therapy Suggested Charges     Code   Minutes Charges    30284 (CPT®) Hc Pt Neuromusc Re Education Ea 15 Min      68634 (CPT®) Hc Pt Ther Proc Ea 15 Min 15 1    79458 (CPT®) Hc Gait Training Ea 15 Min      72650 (CPT®) Hc Pt Therapeutic Act Ea 15 Min      42907 (CPT®) Hc Pt Manual Therapy Ea 15 Min 15 1    28931 (CPT®) Hc Pt Ther Massage- Per 15 Min      02002 (CPT®) Hc Pt Iontophoresis Ea 15 Min      57346 (CPT®) Hc Pt Elec Stim Ea-Per 15 Min 10 1    11670 (CPT®) Hc Pt Ultrasound Ea 15 Min      53358 (CPT®) Hc Pt Self Care/Mgmt/Train Ea 15 Min      25136 (CPT®) Hc Pt Prosthetic (S) Train Initial Encounter, Each 15 Min      70797 (CPT®) Hc Orthotic(S) Mgmt/Train Initial Encounter, Each 15min      76927 (CPT®) Hc Pt Aquatic Therapy Ea 15 Min      38975 (CPT®) Hc Pt Orthotic(S)/Prosthetic(S) Encounter, Each 15 Min       (CPT®) Hc  Pt Electrical Stim Unattended      Total  40 3        Therapy Charges for Today     Code Description Service Date Service Provider Modifiers Qty    46668788331 HC PT THER PROC EA 15 MIN 10/12/2018 Eric Desouza, PT GP 1    34421656555 HC PT MANUAL THERAPY EA 15 MIN 10/12/2018 Eric Desouza, PT GP 1    75553640535 HC PT ELEC STIM EA-PER 15 MIN 10/12/2018 Eric Desouza, PT GP 1                    Eric Desouza PT  10/12/2018

## 2018-12-13 DIAGNOSIS — F41.9 ANXIETY: ICD-10-CM

## 2018-12-13 RX ORDER — IBUPROFEN 600 MG/1
TABLET ORAL
Qty: 60 TABLET | Refills: 2 | OUTPATIENT
Start: 2018-12-13

## 2018-12-13 RX ORDER — ALPRAZOLAM 0.25 MG/1
TABLET ORAL
Qty: 30 TABLET | Refills: 1 | OUTPATIENT
Start: 2018-12-13

## 2018-12-13 NOTE — TELEPHONE ENCOUNTER
Pt actively sees you.    LOV:08/07/18  Next appt: 07/25/19  Last refill:03/23/18    CSA, UDS up to date. No nelly on file.    Ok for refills?

## 2018-12-20 ENCOUNTER — DOCUMENTATION (OUTPATIENT)
Dept: PHYSICAL THERAPY | Facility: HOSPITAL | Age: 63
End: 2018-12-20

## 2018-12-20 DIAGNOSIS — M54.50 MIDLINE LOW BACK PAIN WITHOUT SCIATICA, UNSPECIFIED CHRONICITY: Primary | ICD-10-CM

## 2018-12-20 NOTE — THERAPY DISCHARGE NOTE
Outpatient Physical Therapy Discharge Summary         Patient Name: Livier Valerio  : 1955  MRN: [id number]    Today's Date: 2018    Visit Dx:    ICD-10-CM ICD-9-CM   1. Midline low back pain without sciatica, unspecified chronicity M54.5 724.2       PT OP Goals     Row Name 18 1600          PT Short Term Goals    STG Date to Achieve  10/22/18  -LS     STG 1  Pt will be independent and compliant with HEP.   -LS     STG 1 Progress  Not Met  -LS     STG 2  Pt will decrease complaints of pain to 2/10 at rest, 5/10 with activity.  -LS     STG 2 Progress  Met  -LS     STG 3  Pt will perform functional squats with appropriate knee and foot position and no provocation of low back pain.   -LS     STG 3 Progress  Not Met  -LS     STG 4  Pt will demonstrate B LE MMT hip abd 4/5, hip ext 4/5, hip IR 5/5  -LS     STG 4 Progress  Not Met  -LS        Long Term Goals    LTG Date to Achieve  18  -LS     LTG 1  Pt will be independent and compliant with HEP and self-maintenance of condition.  -LS     LTG 1 Progress  Not Met  -LS     LTG 2  Pt will decrease complaints of pain to 0/10 at rest, 2/10 with activity.  -LS     LTG 2 Progress  Partially Met  -LS     LTG 3  Pt will demonstrate B LE MMT hip abd 5/5, hip ext 5/5, hip IR 5/5  -LS     LTG 3 Progress  Not Met  -LS     LTG 4  Pt will perform all ADLs with no limitations.   -LS     LTG 4 Progress  Not Met  -LS     LTG 5  Pt will return to recreational activities with no limitations.    -LS     LTG 5 Progress  Not Met  -LS       User Key  (r) = Recorded By, (t) = Taken By, (c) = Cosigned By    Initials Name Provider Type    Eric Monteiro PT Physical Therapist          OP PT Discharge Summary  Date of Discharge: 18  Reason for Discharge: Non-compliant  Outcomes Achieved: Patient able to partially acheive established goals  Discharge Destination: Home with home program  Discharge Instructions/Additional Comments: Pt was treated 4 times in 3 weeks  for LBP. Pain was initially severe but quickly decreased with exercise and modalities for pain management. During course of care, pt began having rib pain, likely due to intercostal strain. Pain was completely relieved in the rib in 2 visits. Pt was non-compliant with HEP and frequently complained of intolerance to exercise, despite very low level exercise prescription. Pt reported to PT 2 times with no scheduled appointment and was worked into the schedule once but was not able to be seen the next day. Pt stated she would call and schedule an appointment if needed but no further contact was made.       Time Calculation:        Therapy Suggested Charges     Code   Minutes Charges    None                       Eric Desouza, PT  12/20/2018

## 2018-12-21 ENCOUNTER — OFFICE VISIT (OUTPATIENT)
Dept: INTERNAL MEDICINE | Facility: CLINIC | Age: 63
End: 2018-12-21

## 2018-12-21 VITALS
BODY MASS INDEX: 31.1 KG/M2 | HEART RATE: 82 BPM | HEIGHT: 62 IN | RESPIRATION RATE: 18 BRPM | DIASTOLIC BLOOD PRESSURE: 90 MMHG | SYSTOLIC BLOOD PRESSURE: 145 MMHG | TEMPERATURE: 97.9 F | WEIGHT: 169 LBS

## 2018-12-21 DIAGNOSIS — R00.2 PALPITATIONS: Primary | ICD-10-CM

## 2018-12-21 DIAGNOSIS — I10 ESSENTIAL HYPERTENSION: ICD-10-CM

## 2018-12-21 DIAGNOSIS — H66.92 LEFT ACUTE OTITIS MEDIA: ICD-10-CM

## 2018-12-21 PROCEDURE — 93000 ELECTROCARDIOGRAM COMPLETE: CPT | Performed by: NURSE PRACTITIONER

## 2018-12-21 PROCEDURE — 99214 OFFICE O/P EST MOD 30 MIN: CPT | Performed by: NURSE PRACTITIONER

## 2018-12-21 RX ORDER — LISINOPRIL AND HYDROCHLOROTHIAZIDE 12.5; 1 MG/1; MG/1
1 TABLET ORAL DAILY
Qty: 30 TABLET | Refills: 1 | Status: SHIPPED | OUTPATIENT
Start: 2018-12-21 | End: 2019-01-22 | Stop reason: SDUPTHER

## 2018-12-21 RX ORDER — AMOXICILLIN 875 MG/1
875 TABLET, COATED ORAL 2 TIMES DAILY
Qty: 20 TABLET | Refills: 0 | Status: SHIPPED | OUTPATIENT
Start: 2018-12-21 | End: 2019-01-22

## 2018-12-21 NOTE — PROGRESS NOTES
"Subjective:    Livier Valerio is a 63 y.o. female.     Chief Complaint   Patient presents with   • Earache     left face pain/left ear pain x7 days        History of Present Illness   Patient complains of left ear pressure /pain x 7 days. The pain is radiating down left side of face, below ear. No fever.    She feels like her heart is \"flip flopping\" and it has done this intermittently for years. At night she can feel heart beat in head and ears. Patient states her insurance will be changing in the near future and she does not want to proceed with any cardiac work up/tests. Discussed if chest pain occurs to proceed to emergency room. Patient agrees with plan. Worsened with anxiety. Patient states she is anxious about flying to [place] in summer. She states she has a history of becoming anxious about things and having palpitations. Patient takes Xanax as needed. Patient reports negative cardiac tests in [place] about 3 years ago.    Discussed consistently elevated blood pressure. Patient denies chest pain, leg swelling or shortness of breath. Patient agreeable to begin medicine. Worsened by obesity and inactivity.    Current Outpatient Medications:   •  ALPRAZolam (XANAX) 0.25 MG tablet, Take 1 tablet by mouth Daily As Needed for Anxiety., Disp: 30 tablet, Rfl: 1  •  coenzyme Q10 100 MG capsule, Take 100 mg by mouth Daily., Disp: , Rfl:   •  cyclobenzaprine (FLEXERIL) 10 MG tablet, Take 1 tablet by mouth As Needed for Muscle Spasms., Disp: 30 tablet, Rfl: 0  •  Nutritional Supplements (ESTROVEN PO), Take  by mouth., Disp: , Rfl:   •  Oxymetazoline HCl (NASAL SPRAY NA), into each nostril. [facility], Disp: , Rfl:   •  Probiotic Product (PROBIOTIC DAILY PO), Take  by mouth., Disp: , Rfl:   •  amoxicillin (AMOXIL) 875 MG tablet, Take 1 tablet by mouth 2 (Two) Times a Day., Disp: 20 tablet, Rfl: 0  •  Garlic 500 MG capsule, Take  by mouth., Disp: , Rfl:   •  ibuprofen (ADVIL,MOTRIN) 600 MG tablet, Take 1 tablet by " "mouth Every 6 (Six) Hours As Needed for Mild Pain ., Disp: 60 tablet, Rfl: 2  •  lisinopril-hydrochlorothiazide (ZESTORETIC) 10-12.5 MG per tablet, Take 1 tablet by mouth Daily., Disp: 30 tablet, Rfl: 1  •  Multiple Vitamin (MULTI-DAY VITAMINS PO), Take  by mouth., Disp: , Rfl:   •  Pyridoxine HCl (VITAMIN B-6 PO), Take  by mouth., Disp: , Rfl:      The following portions of the patient's history were reviewed and updated as appropriate: allergies, current medications, past family history, past medical history, past social history, past surgical history and problem list.    Review of Systems   Constitutional: Negative for chills, fatigue, fever and unexpected weight change.   HENT: Positive for ear pain. Negative for congestion, postnasal drip, rhinorrhea, sinus pressure, sneezing and sore throat.    Eyes: Negative for pain, discharge, redness, itching and visual disturbance.   Respiratory: Negative for cough, chest tightness, shortness of breath and wheezing.    Cardiovascular: Positive for palpitations. Negative for chest pain and leg swelling.        No TAPIA, orthopnea, or claudication. HTN   Gastrointestinal: Negative for abdominal pain, blood in stool, constipation, diarrhea, nausea and vomiting.        Denies melena.   Endocrine: Negative for polydipsia and polyuria.   Musculoskeletal: Negative for arthralgias and myalgias.   Skin: Negative for rash.   Neurological: Negative for dizziness, syncope, light-headedness and headaches.        No memory issues.   Hematological: Negative for adenopathy.   Psychiatric/Behavioral: Negative for decreased concentration. The patient is nervous/anxious.        Objective:    /90 (BP Location: Right arm, Patient Position: Sitting, Cuff Size: Adult)   Pulse 82   Temp 97.9 °F (36.6 °C)   Resp 18   Ht 157.5 cm (62\")   Wt 76.7 kg (169 lb)   BMI 30.91 kg/m²     Physical Exam   Constitutional: She is oriented to person, place, and time. She appears well-developed and " well-nourished.  Non-toxic appearance. She does not have a sickly appearance. She does not appear ill. No distress.   HENT:   Head: Normocephalic and atraumatic.   Right Ear: Tympanic membrane, external ear and ear canal normal.   Left Ear: External ear and ear canal normal. Tympanic membrane is injected and erythematous.   Mouth/Throat: Oropharynx is clear and moist and mucous membranes are normal. No oral lesions.   No sinus tenderness to palpation.   Eyes: Lids are normal.   Neck: Normal range of motion. Neck supple. Carotid bruit is not present. No thyromegaly present.   Cardiovascular: Normal rate, regular rhythm, normal heart sounds and intact distal pulses. Exam reveals no gallop and no friction rub.   No murmur heard.  No peripheral edema.   Pulmonary/Chest: Effort normal and breath sounds normal.   Abdominal: Soft. Bowel sounds are normal. She exhibits no distension, no abdominal bruit and no mass. There is no hepatosplenomegaly. There is no tenderness.   Lymphadenopathy:     She has no cervical adenopathy.   Neurological: She is alert and oriented to person, place, and time.   Skin: Skin is warm, dry and intact. No rash noted.   Psychiatric: Her mood appears anxious.   Nursing note and vitals reviewed.      Assessment/Plan:    Livier was seen today for earache.    Diagnoses and all orders for this visit:    Palpitations  -     ECG 12 Lead    Essential hypertension  -     lisinopril-hydrochlorothiazide (ZESTORETIC) 10-12.5 MG per tablet; Take 1 tablet by mouth Daily.  Inform provider of any intolerances to medication. Monitor blood pressure.  Left acute otitis media  -     amoxicillin (AMOXIL) 875 MG tablet; Take 1 tablet by mouth 2 (Two) Times a Day.  Tylenol as needed.    ECG 12 Lead  Date/Time: 12/21/2018 5:28 PM  Performed by: Christiano Brown APRN  Authorized by: Christiano Brown APRN   Comparison: not compared with previous ECG   Rhythm: sinus tachycardia  Rate: tachycardic  Clinical impression:  non-specific ECG            Return in about 1 month (around 1/21/2019).

## 2019-01-22 ENCOUNTER — OFFICE VISIT (OUTPATIENT)
Dept: INTERNAL MEDICINE | Facility: CLINIC | Age: 64
End: 2019-01-22

## 2019-01-22 VITALS
BODY MASS INDEX: 30.33 KG/M2 | RESPIRATION RATE: 16 BRPM | TEMPERATURE: 97.1 F | SYSTOLIC BLOOD PRESSURE: 136 MMHG | WEIGHT: 164.8 LBS | HEART RATE: 103 BPM | HEIGHT: 62 IN | DIASTOLIC BLOOD PRESSURE: 84 MMHG | OXYGEN SATURATION: 98 %

## 2019-01-22 DIAGNOSIS — M54.50 CHRONIC BILATERAL LOW BACK PAIN WITHOUT SCIATICA: ICD-10-CM

## 2019-01-22 DIAGNOSIS — Z23 NEED FOR VACCINATION: ICD-10-CM

## 2019-01-22 DIAGNOSIS — G89.29 CHRONIC BILATERAL LOW BACK PAIN WITHOUT SCIATICA: ICD-10-CM

## 2019-01-22 DIAGNOSIS — F41.9 ANXIETY: ICD-10-CM

## 2019-01-22 DIAGNOSIS — R52 PAIN: Primary | ICD-10-CM

## 2019-01-22 DIAGNOSIS — I10 ESSENTIAL HYPERTENSION: ICD-10-CM

## 2019-01-22 PROCEDURE — 90471 IMMUNIZATION ADMIN: CPT | Performed by: INTERNAL MEDICINE

## 2019-01-22 PROCEDURE — 99214 OFFICE O/P EST MOD 30 MIN: CPT | Performed by: INTERNAL MEDICINE

## 2019-01-22 PROCEDURE — 90632 HEPA VACCINE ADULT IM: CPT | Performed by: INTERNAL MEDICINE

## 2019-01-22 RX ORDER — LISINOPRIL AND HYDROCHLOROTHIAZIDE 12.5; 1 MG/1; MG/1
1 TABLET ORAL DAILY
Qty: 30 TABLET | Refills: 1 | Status: SHIPPED | OUTPATIENT
Start: 2019-01-22 | End: 2019-02-04 | Stop reason: SDUPTHER

## 2019-01-22 RX ORDER — DIAZEPAM 5 MG/1
TABLET ORAL
Qty: 30 TABLET | Refills: 0 | Status: SHIPPED | OUTPATIENT
Start: 2019-01-22 | End: 2019-03-29

## 2019-01-22 RX ORDER — IBUPROFEN 600 MG/1
600 TABLET ORAL EVERY 6 HOURS PRN
Qty: 60 TABLET | Refills: 2 | Status: SHIPPED | OUTPATIENT
Start: 2019-01-22 | End: 2019-10-14 | Stop reason: SDUPTHER

## 2019-01-22 NOTE — PROGRESS NOTES
"Subjective   Livier Valerio is a 64 y.o. female.     History of Present Illness     1. Palpitations-chronic, recurrent  Duration chronic several years.  Patient says that her palpitations are usually occurring when she is under stress, excited, and usually occurs in the evening hours.  Patient denies any shorts breath, dyspnea on exertion, syncope, fatigue, fainting episodes, or any other systemic symptoms.  She currently is under somewhat stress and anxiousness as she is getting ready to travel [place] and she says that any little bit of stress tends to bring on the palpitations.    2 HTN- chronic and controlled.  Patient says that her blood pressures well-controlled she's had no side effects of the medication.  She continues on a low-sodium diet  Medications: Patient is currently taking and continues on the lisinopril/HCTZ  10/12.5 have a by mouth once a day.      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  Patient has been seen by physical therapy and she says that the therapy is not improving, but she also feels that she needs a second opinion as \"they are not listening to her \"in regards to putting out where her specific pain is.       Past medical history   Anxiety  Cardiac stress test: negative in 2013    Family History  Heart disease- grandfather  MI- mother age 75 with bypass x 3 vessels      Social History no EtOH consumption, no IV drug use, no marijuana, no illicit drugs, no sick or smoking.  + Caffeine consumption one cup a day        Review of Systems   All other systems reviewed and are negative.      Objective   Physical Exam   Constitutional: She is oriented to person, place, and time. She appears well-developed and well-nourished.   HENT:   Head: Normocephalic.   Right Ear: External ear normal.   Left Ear: External ear normal.   Nose: Nose normal.   Mouth/Throat: Oropharynx " is clear and moist.   Eyes: Conjunctivae and EOM are normal. Pupils are equal, round, and reactive to light.   Neck: Normal range of motion. Neck supple.   Cardiovascular: Normal rate, regular rhythm and normal heart sounds.   Pulmonary/Chest: Effort normal and breath sounds normal.   Abdominal: Soft.   Musculoskeletal: Normal range of motion.   Neurological: She is alert and oriented to person, place, and time.   Skin: Skin is warm.   Psychiatric: She has a normal mood and affect. Her behavior is normal. Judgment and thought content normal.   Nursing note and vitals reviewed.        Assessment/Plan   Livier was seen today for palpitations, hypertension and menopause.    Diagnoses and all orders for this visit:    Pain  -     ibuprofen (ADVIL,MOTRIN) 600 MG tablet; Take 1 tablet by mouth Every 6 (Six) Hours As Needed for Mild Pain .    Essential hypertension  -     lisinopril-hydrochlorothiazide (ZESTORETIC) 10-12.5 MG per tablet; Take 1 tablet by mouth Daily.    Anxiety  -     sertraline (ZOLOFT) 50 MG tablet; Take 1 tablet by mouth Daily.    D/c xanax    -     diazePAM (VALIUM) 5 MG tablet; Take one tablet po bid prn for anxiety     Chronic bilateral low back pain without sciatica  -     Ambulatory Referral to Physical Therapy Evaluate and treat    Anticipatory guidance:  Palpitations seem to be more related to stress and previous EKG reviewed showed sinus tachycardia as well as previous stress test negative perform a cardiology.  Recommend continuing observation and after getting anxiety under control if patient continues with the palpitations we will consider a 24 or 48 hour Holter.  Patient agree with plan.

## 2019-01-23 ENCOUNTER — TELEPHONE (OUTPATIENT)
Dept: INTERNAL MEDICINE | Facility: CLINIC | Age: 64
End: 2019-01-23

## 2019-01-23 NOTE — TELEPHONE ENCOUNTER
Pt notified. She verbalized good understanding, thanked our office and we ended the call. She will call back if there is still an issue.

## 2019-01-23 NOTE — TELEPHONE ENCOUNTER
Ok, let us try cutting the dose in half to see if this will help.  Dizziness and GI side effects can be very common with the SSRI class i.e. Zoloft if she continues to have the symptoms we may need to change to another medication.    I still would go ahead and take the medication at night    Let me know

## 2019-01-23 NOTE — TELEPHONE ENCOUNTER
----- Message from Nighat Hair sent at 1/23/2019 11:40 AM EST -----  Patient states she has been dizzy all night, and vomiting after starting Zoloft yesterday. Wants to know if this will go away after being on it a few days or should she cut it in half? Patient can be reached at [phone] to discuss.

## 2019-01-25 ENCOUNTER — TELEPHONE (OUTPATIENT)
Dept: INTERNAL MEDICINE | Facility: CLINIC | Age: 64
End: 2019-01-25

## 2019-01-25 DIAGNOSIS — I10 ESSENTIAL HYPERTENSION: ICD-10-CM

## 2019-01-25 NOTE — TELEPHONE ENCOUNTER
----- Message from Soco Navarro sent at 2019  1:44 PM EST -----  PT STATES SHE IS GOING TO STOP TAKING ZOLOFT BECAUSE IT CAUSES HER TO HAVE EXTREME NAUSEA. SHE WOULD LIKE TO GET A NEW RX.     ERYN BERGER :[birth year]  [phone]

## 2019-01-28 NOTE — TELEPHONE ENCOUNTER
Pt called back, Yes please! She would like to have the genetics testing done. She is just now long-term feeling decent today. She states it got to the point of almost non stop vomiting and nausea with the zoloft. I have updated pt's allergy and medication list. Please place order. I advised pt of lab times and told her to stop by. She thanked our office and we ended the call.

## 2019-01-28 NOTE — TELEPHONE ENCOUNTER
Please tell patient to discontinue the Zoloft.    I would highly recommend her getting a pharmacogenetics test-which is a blood test that can determine specifically which medications she would do better on based on her genetics.    This can be obtained either by a salivary swab or blood test-either way we have both test here in the clinic and this will help alleviate any trial and error on multiple drugs until we get it right type of approach

## 2019-02-04 RX ORDER — LISINOPRIL AND HYDROCHLOROTHIAZIDE 12.5; 1 MG/1; MG/1
1 TABLET ORAL DAILY
Qty: 30 TABLET | Refills: 1 | Status: SHIPPED | OUTPATIENT
Start: 2019-02-04 | End: 2019-03-29 | Stop reason: SDUPTHER

## 2019-02-18 ENCOUNTER — TELEPHONE (OUTPATIENT)
Dept: INTERNAL MEDICINE | Facility: CLINIC | Age: 64
End: 2019-02-18

## 2019-02-18 ENCOUNTER — HOSPITAL ENCOUNTER (OUTPATIENT)
Dept: GENERAL RADIOLOGY | Facility: HOSPITAL | Age: 64
Discharge: HOME | End: 2019-02-18
Admitting: NURSE PRACTITIONER

## 2019-02-18 ENCOUNTER — OFFICE VISIT (OUTPATIENT)
Dept: INTERNAL MEDICINE | Facility: CLINIC | Age: 64
End: 2019-02-18

## 2019-02-18 VITALS
SYSTOLIC BLOOD PRESSURE: 135 MMHG | TEMPERATURE: 97.7 F | HEIGHT: 62 IN | RESPIRATION RATE: 18 BRPM | HEART RATE: 98 BPM | DIASTOLIC BLOOD PRESSURE: 72 MMHG | WEIGHT: 163 LBS | BODY MASS INDEX: 30 KG/M2

## 2019-02-18 DIAGNOSIS — R07.81 RIB PAIN: Primary | ICD-10-CM

## 2019-02-18 PROCEDURE — 99213 OFFICE O/P EST LOW 20 MIN: CPT | Performed by: NURSE PRACTITIONER

## 2019-02-18 PROCEDURE — 71101 X-RAY EXAM UNILAT RIBS/CHEST: CPT

## 2019-02-18 NOTE — PROGRESS NOTES
Subjective:    Livier Valerio is a 64 y.o. female.     Chief Complaint   Patient presents with   • Chest Pain     rib pain left side x4 days        History of Present Illness   Patient complains of left rib pain x 4 days. She states she was at physical therapy and therapist applied pressure to left rib area and she heard and felt a pop. She wanted to make sure she did not have a fracture before returning to therapy today. She states left rib hurts all the time and worsens with movement and deep breathing. She has taken Tylenol with some relief.     Current Outpatient Medications:   •  coenzyme Q10 100 MG capsule, Take 100 mg by mouth Daily., Disp: , Rfl:   •  cyclobenzaprine (FLEXERIL) 10 MG tablet, Take 1 tablet by mouth As Needed for Muscle Spasms., Disp: 30 tablet, Rfl: 0  •  diazePAM (VALIUM) 5 MG tablet, Take one tablet po bid prn for anxiety, Disp: 30 tablet, Rfl: 0  •  ibuprofen (ADVIL,MOTRIN) 600 MG tablet, Take 1 tablet by mouth Every 6 (Six) Hours As Needed for Mild Pain ., Disp: 60 tablet, Rfl: 2  •  lisinopril-hydrochlorothiazide (ZESTORETIC) 10-12.5 MG per tablet, Take 1 tablet by mouth Daily., Disp: 30 tablet, Rfl: 1  •  Multiple Vitamin (MULTI-DAY VITAMINS PO), Take  by mouth., Disp: , Rfl:   •  Oxymetazoline HCl (NASAL SPRAY NA), into each nostril. [employer] brand, Disp: , Rfl:   •  Probiotic Product (PROBIOTIC DAILY PO), Take  by mouth., Disp: , Rfl:      The following portions of the patient's history were reviewed and updated as appropriate: allergies, current medications, past family history, past medical history, past social history, past surgical history and problem list.    Review of Systems   Constitutional: Negative.    HENT: Negative.    Eyes: Negative.    Respiratory: Negative.    Cardiovascular: Negative.    Gastrointestinal: Negative.    Endocrine: Negative.    Genitourinary: Negative.    Musculoskeletal:        Left rib pain   Skin: Negative.    Allergic/Immunologic: Negative.   "  Neurological: Negative.    Hematological: Negative.        Objective:    /72   Pulse 98   Temp 97.7 °F (36.5 °C)   Resp 18   Ht 157.5 cm (62\")   Wt 73.9 kg (163 lb)   BMI 29.81 kg/m²     Physical Exam   Constitutional: She appears well-developed and well-nourished.  Non-toxic appearance. She does not have a sickly appearance. She does not appear ill. No distress.   HENT:   Head: Normocephalic and atraumatic.   Right Ear: External ear normal.   Left Ear: External ear normal.   Nose: Nose normal.   Mouth/Throat: Oropharynx is clear and moist and mucous membranes are normal.   Neck: Normal range of motion. Neck supple. Carotid bruit is not present. No thyromegaly present.   Cardiovascular: Normal rate, regular rhythm and normal heart sounds. Exam reveals no gallop and no friction rub.   No murmur heard.  Pulmonary/Chest: Effort normal and breath sounds normal.   Abdominal: Soft. Bowel sounds are normal. She exhibits no distension, no abdominal bruit and no mass. There is no hepatosplenomegaly. There is no tenderness.   Musculoskeletal:   Left rib tenderness   Skin: Skin is warm, dry and intact. No bruising and no rash noted. No erythema.   Psychiatric: She has a normal mood and affect.   Nursing note and vitals reviewed.      Assessment/Plan:    Livier was seen today for chest pain.    Diagnoses and all orders for this visit:    Rib pain  -     XR Ribs Left With PA Chest    Discussed normal x ray. Ibuprofen or Tylenol as needed.    Return if symptoms worsen or fail to improve.  "

## 2019-02-18 NOTE — TELEPHONE ENCOUNTER
I did discuss this with Nighat, because patient has a new acute issue in a different location than previously evaluated she would actually have to be seen.    Patient is actually being seen today by extendor

## 2019-02-18 NOTE — TELEPHONE ENCOUNTER
----- Message from Nighat Hair sent at 2/18/2019  1:14 PM EST -----  Patient stopped by office and states she has been doing physical therapy on back at [facility] and while there they did some stretches and she felt a pop in her lower left rib area. Wants to know if she can get an xray on it to today to see if anything is going on with it before she has therapy today at 2:45 as she is continuing to have discomfort.

## 2019-02-22 ENCOUNTER — OFFICE VISIT (OUTPATIENT)
Dept: INTERNAL MEDICINE | Facility: CLINIC | Age: 64
End: 2019-02-22

## 2019-02-22 ENCOUNTER — TELEPHONE (OUTPATIENT)
Dept: INTERNAL MEDICINE | Facility: CLINIC | Age: 64
End: 2019-02-22

## 2019-02-22 VITALS
WEIGHT: 165.8 LBS | HEIGHT: 62 IN | OXYGEN SATURATION: 97 % | HEART RATE: 72 BPM | DIASTOLIC BLOOD PRESSURE: 78 MMHG | RESPIRATION RATE: 16 BRPM | SYSTOLIC BLOOD PRESSURE: 130 MMHG | TEMPERATURE: 97.7 F | BODY MASS INDEX: 30.51 KG/M2

## 2019-02-22 DIAGNOSIS — F41.9 ANXIETY: Primary | ICD-10-CM

## 2019-02-22 DIAGNOSIS — M94.0 COSTOCHONDRITIS: ICD-10-CM

## 2019-02-22 DIAGNOSIS — G89.29 CHRONIC BILATERAL LOW BACK PAIN WITHOUT SCIATICA: ICD-10-CM

## 2019-02-22 DIAGNOSIS — M54.50 CHRONIC BILATERAL LOW BACK PAIN WITHOUT SCIATICA: ICD-10-CM

## 2019-02-22 PROCEDURE — 99214 OFFICE O/P EST MOD 30 MIN: CPT | Performed by: INTERNAL MEDICINE

## 2019-02-22 RX ORDER — ALPRAZOLAM 1 MG/1
TABLET ORAL
Qty: 8 TABLET | Refills: 0 | Status: SHIPPED | OUTPATIENT
Start: 2019-02-22 | End: 2019-11-12

## 2019-02-22 RX ORDER — PREDNISONE 10 MG/1
TABLET ORAL
Qty: 12 TABLET | Refills: 0 | Status: SHIPPED | OUTPATIENT
Start: 2019-02-22 | End: 2019-03-29

## 2019-02-22 NOTE — PROGRESS NOTES
"Subjective   Livier Valerio is a 64 y.o. female.     History of Present Illness     1 anxiety- Patient says that she has been off medication for several weeks now.  Patient says that she stopped the Zoloft because she was having side effects nausea, vomiting, dizziness.  She says that she has more control over her anxiety because it is \"situational \"and she is able to cope with it.  Patient says that she may need a Xanax or Valium with major stresses such as airline travel other than that everything else has been doing very well.    2 low back pain-chronic.  Patient has been going to physical therapy and this has helped improve her lower lumbar symptoms.  Patient has also been complaining of some mild left-sided rib pain this occurred shortly after a back massage which she heard a pop in the left rib region and the pain hurts with inspiration and cough.    Review of Systems   All other systems reviewed and are negative.      Objective   Physical Exam   Constitutional: She appears well-developed and well-nourished.   HENT:   Head: Normocephalic.   Right Ear: External ear normal.   Left Ear: External ear normal.   Mouth/Throat: Oropharynx is clear and moist.   Eyes: Conjunctivae and EOM are normal. Pupils are equal, round, and reactive to light.   Neck: Normal range of motion. Neck supple.   Cardiovascular: Normal rate, regular rhythm and normal heart sounds.   Pulmonary/Chest: Effort normal and breath sounds normal.   Musculoskeletal: She exhibits tenderness.   Tenderness to palpation on the left costal region and also reproducible with cough and deep inspiration.   Nursing note and vitals reviewed.        Assessment/Plan   Livier was seen today for anxiety and back pain.    Diagnoses and all orders for this visit:    Anxiety-recommend continue with biofeedback coping skills, continue with self reflection and identifying triggers.    Chronic bilateral low back pain without sciatica-continue with physical therapy but " put on hold for now due to recent costochondritis.    Costochondritis-NSAIDs, heating pad, passive range of motion, activity is limited

## 2019-02-22 NOTE — TELEPHONE ENCOUNTER
----- Message from Ebonie Costa sent at 2/22/2019  1:38 PM EST -----  PATIENT STATES SHE THOUGHT PREDNISONE AND XANAX WAS SUPPOSED TO BE CALLED INTO  Health system IN  [place] AFTER SHE LEFT HER APPT EARLIER. SHE CAN BE REACHED -[phone]

## 2019-03-11 ENCOUNTER — TELEPHONE (OUTPATIENT)
Dept: INTERNAL MEDICINE | Facility: CLINIC | Age: 64
End: 2019-03-11

## 2019-03-12 ENCOUNTER — TELEPHONE (OUTPATIENT)
Dept: INTERNAL MEDICINE | Facility: CLINIC | Age: 64
End: 2019-03-12

## 2019-03-12 NOTE — TELEPHONE ENCOUNTER
----- Message from Kourtney Avery sent at 3/12/2019 12:29 PM EDT -----  Contact: PATIENT  PATIENT RETURNED CALL

## 2019-03-17 DIAGNOSIS — M62.838 MUSCLE SPASM: ICD-10-CM

## 2019-03-18 RX ORDER — CYCLOBENZAPRINE HCL 10 MG
TABLET ORAL
Qty: 50 TABLET | Refills: 2 | OUTPATIENT
Start: 2019-03-18

## 2019-03-29 ENCOUNTER — OFFICE VISIT (OUTPATIENT)
Dept: INTERNAL MEDICINE | Facility: CLINIC | Age: 64
End: 2019-03-29

## 2019-03-29 VITALS
DIASTOLIC BLOOD PRESSURE: 80 MMHG | HEART RATE: 60 BPM | WEIGHT: 165.5 LBS | TEMPERATURE: 98.5 F | RESPIRATION RATE: 20 BRPM | BODY MASS INDEX: 30.27 KG/M2 | SYSTOLIC BLOOD PRESSURE: 130 MMHG

## 2019-03-29 DIAGNOSIS — M54.5 CHRONIC LOW BACK PAIN, UNSPECIFIED BACK PAIN LATERALITY, WITH SCIATICA PRESENCE UNSPECIFIED: Primary | ICD-10-CM

## 2019-03-29 DIAGNOSIS — I10 ESSENTIAL HYPERTENSION: ICD-10-CM

## 2019-03-29 DIAGNOSIS — G89.29 CHRONIC LOW BACK PAIN, UNSPECIFIED BACK PAIN LATERALITY, WITH SCIATICA PRESENCE UNSPECIFIED: Primary | ICD-10-CM

## 2019-03-29 PROCEDURE — 99214 OFFICE O/P EST MOD 30 MIN: CPT | Performed by: INTERNAL MEDICINE

## 2019-03-29 RX ORDER — LISINOPRIL AND HYDROCHLOROTHIAZIDE 12.5; 1 MG/1; MG/1
1 TABLET ORAL DAILY
Qty: 90 TABLET | Refills: 2 | Status: SHIPPED | OUTPATIENT
Start: 2019-03-29 | End: 2020-03-11

## 2019-03-30 NOTE — PROGRESS NOTES
Subjective   Livier Valerio is a 64 y.o. female.     History of Present Illness     Here to review for pharmacogenetics testing.    2 anxiety-chronic and controlled.  No other active issues at this time and overall her emotional is doing well.    Chronic low back pain- patient says that she is doing fairly well but continues to have lower lumbar back pain.  She previously had been to physical therapy and this helped.  She would like to go back for further treatment and management    Review of Systems   All other systems reviewed and are negative.      Objective   Physical Exam   Constitutional: She appears well-developed and well-nourished.   HENT:   Head: Normocephalic.   Right Ear: External ear normal.   Left Ear: External ear normal.   Nose: Nose normal.   Mouth/Throat: Oropharynx is clear and moist.   Eyes: Conjunctivae and EOM are normal. Pupils are equal, round, and reactive to light.   Neck: Normal range of motion. Neck supple.   Cardiovascular: Normal rate, regular rhythm, normal heart sounds and intact distal pulses.   Pulmonary/Chest: Effort normal and breath sounds normal.   Abdominal: Soft. Bowel sounds are normal.   Musculoskeletal: Normal range of motion.   Neurological: She is alert.   Skin: Skin is warm. Capillary refill takes less than 2 seconds.   Psychiatric: She has a normal mood and affect. Her behavior is normal. Judgment and thought content normal.   Nursing note and vitals reviewed.        Assessment/Plan   Livier was seen today for labs only.    Diagnoses and all orders for this visit:    Chronic low back pain, unspecified back pain laterality, with sciatica presence unspecified  -     Ambulatory Referral to Physical Therapy Evaluate and treat    Essential hypertension  -     lisinopril-hydrochlorothiazide (ZESTORETIC) 10-12.5 MG per tablet; Take 1 tablet by mouth Daily.

## 2019-06-13 DIAGNOSIS — M62.838 MUSCLE SPASM: ICD-10-CM

## 2019-06-14 RX ORDER — CYCLOBENZAPRINE HCL 10 MG
TABLET ORAL
Qty: 50 TABLET | Refills: 2 | Status: SHIPPED | OUTPATIENT
Start: 2019-06-14 | End: 2020-03-17

## 2019-07-24 NOTE — PROGRESS NOTES
Chief Complaint   Patient presents with   • Annual Exam     Fasting        Subjective     Physical  History of Present Illness     Physical.    Patient complains of new issue-burning with urination and urine odor for 1.5 weeks. No urgency, frequency, discharge or itching. No fever, chills, nausea or abdominal pain.     Patient complains of skin lesion on right hand. She states it is the same size, but has been itchy. Patient will monitor and call if she wants to proceed to dermatology.     Patient expresses anxiety about upcoming flight to [place]. She has medicine to take as needed.   Livier Valerio is a 64 y.o. female.     Are you currently seeing any other doctors or specialists?  None  Are you currently taking any OTC medications or herbal medications?   None  Sleep: 8-9 hours  Diet: balanced  Exercise: yes-walks a mile at least per day    Most recent colonoscopy: current  Most recent mammogram: will order  Most recent pap smear: current  First day of last menses: age 58    Regular dental visits: dentures  Regular eye exams: current    The following portions of the patient's history were reviewed and updated as appropriate: allergies, current medications, past family history, past medical history, past social history, past surgical history and problem list.    Allergies   Allergen Reactions   • Zoloft [Sertraline Hcl] Nausea And Vomiting     Severe nausea/vomiting     Social History     Tobacco Use   • Smoking status: Never Smoker   • Smokeless tobacco: Never Used   Substance Use Topics   • Alcohol use: No     History reviewed. No pertinent surgical history.  Family History   Problem Relation Age of Onset   • Diabetes Mother    • Hyperlipidemia Mother    • Heart attack Maternal Grandmother    • Heart attack Maternal Grandfather    • Breast cancer Neg Hx    • Ovarian cancer Neg Hx          Current Outpatient Medications:   •  Black Cohosh-SoyIsoflav-Magnol (ESTROVEN MENOPAUSE RELIEF PO), Take  by mouth Daily.,  Disp: , Rfl:   •  cyclobenzaprine (FLEXERIL) 10 MG tablet, TAKE 1 TABLET BY MOUTH THREE TIMES DAILY AS NEEDED FOR  MUSCLE  SPASM, Disp: 50 tablet, Rfl: 2  •  esomeprazole (nexIUM) 20 MG capsule, Take 20 mg by mouth Every Morning Before Breakfast., Disp: , Rfl:   •  ibuprofen (ADVIL,MOTRIN) 600 MG tablet, Take 1 tablet by mouth Every 6 (Six) Hours As Needed for Mild Pain ., Disp: 60 tablet, Rfl: 2  •  lisinopril-hydrochlorothiazide (ZESTORETIC) 10-12.5 MG per tablet, Take 1 tablet by mouth Daily., Disp: 90 tablet, Rfl: 2  •  Multiple Vitamin (MULTI-DAY VITAMINS PO), Take  by mouth., Disp: , Rfl:   •  Oxymetazoline HCl (NASAL SPRAY NA), into each nostril. [employer] brand, Disp: , Rfl:   •  Probiotic Product (PROBIOTIC DAILY PO), Take  by mouth., Disp: , Rfl:   •  ALPRAZolam (XANAX) 1 MG tablet, Take 30 minutes before boarding flight, may repeat dose in 6 hours if needed, Disp: 8 tablet, Rfl: 0  •  coenzyme Q10 100 MG capsule, Take 100 mg by mouth Daily., Disp: , Rfl:   •  promethazine (PHENERGAN) 25 MG tablet, Take 1 tablet by mouth Every 6 (Six) Hours As Needed for Nausea or Vomiting., Disp: 20 tablet, Rfl: 0  •  sulfamethoxazole-trimethoprim (BACTRIM DS) 800-160 MG per tablet, Take 1 tablet by mouth 2 (Two) Times a Day., Disp: 20 tablet, Rfl: 0    Patient Active Problem List   Diagnosis   • Muscle strain   • Palpitations   • Essential hypertension   • Skin change       Review of Systems   Constitutional: Negative for chills, fatigue and fever.   HENT: Negative for congestion, dental problem, mouth sores, nosebleeds, postnasal drip, rhinorrhea, sinus pressure, sneezing and sore throat.         Denies snoring.   Eyes: Negative for pain, discharge, redness and itching.   Respiratory: Negative for cough, shortness of breath and wheezing.    Cardiovascular: Negative for chest pain, palpitations and leg swelling.        No TAPIA, orthopnea, PND, or claudication.   Gastrointestinal: Negative for abdominal distention, abdominal  pain, blood in stool, diarrhea, nausea and vomiting.   Endocrine: Negative for cold intolerance, heat intolerance, polydipsia and polyuria.   Genitourinary: Positive for dysuria. Negative for difficulty urinating, frequency, hematuria and urgency.        Urine odor   Musculoskeletal: Negative for arthralgias, gait problem, joint swelling and myalgias.   Skin: Positive for skin lesions. Negative for color change and rash.        No wound.   Neurological: Negative for dizziness, syncope, weakness, light-headedness and numbness.   Hematological: Negative for adenopathy. Does not bruise/bleed easily.   Psychiatric/Behavioral: The patient is nervous/anxious.        Objective   Vitals:    07/25/19 0916   BP: 130/74   Pulse: 78   Resp: 18   Temp: 97.1 °F (36.2 °C)     Physical Exam   Constitutional: She is oriented to person, place, and time. She appears well-developed and well-nourished. She is active and cooperative. She is easily aroused.  Non-toxic appearance. She does not have a sickly appearance. She does not appear ill. No distress. She is obese.  HENT:   Head: Normocephalic and atraumatic. Head is without abrasion. Hair is normal.   Right Ear: Hearing, tympanic membrane, external ear and ear canal normal. No foreign bodies. Tympanic membrane is not perforated and not erythematous.   Left Ear: Hearing, tympanic membrane, external ear and ear canal normal. No foreign bodies. Tympanic membrane is not perforated and not erythematous.   Nose: Nose normal. No mucosal edema, rhinorrhea or septal deviation. No epistaxis.  No foreign bodies.   Mouth/Throat: Uvula is midline and oropharynx is clear and moist. No oral lesions. Normal dentition.   Eyes: Conjunctivae and lids are normal. Pupils are equal, round, and reactive to light. Right eye exhibits no discharge. Left eye exhibits no discharge. Right conjunctiva is not injected. Left conjunctiva is not injected. No scleral icterus.   Neck: Normal range of motion and full  passive range of motion without pain. Neck supple. No edema and normal range of motion present. No thyroid mass and no thyromegaly present.   Cardiovascular: Normal rate, regular rhythm and normal heart sounds. Exam reveals no gallop and no friction rub.   No murmur heard.  Pulmonary/Chest: Effort normal and breath sounds normal. No accessory muscle usage. She has no rhonchi. She has no rales. She exhibits no tenderness.   Abdominal: Soft. Bowel sounds are normal. She exhibits no distension. There is no hepatosplenomegaly or hepatomegaly. There is no tenderness. There is no CVA tenderness.   Musculoskeletal: Normal range of motion. She exhibits no edema, tenderness or deformity.   Lymphadenopathy:     She has no cervical adenopathy.   Neurological: She is alert, oriented to person, place, and time and easily aroused. She has normal reflexes. No cranial nerve deficit. Coordination normal.   Muscle strength 5/5 and equal throughout.   Skin: Skin is warm, dry and intact. Lesion noted. No abrasion and no rash noted. She is not diaphoretic. No cyanosis or erythema. Nails show no clubbing.   Hyperpigmented macular lesion right hand   Psychiatric: She has a normal mood and affect. Her speech is normal and behavior is normal.   Nursing note and vitals reviewed.        Results for orders placed or performed in visit on 07/25/19   POCT urinalysis dipstick, automated   Result Value Ref Range    Color Yellow Yellow, Straw, Dark Yellow, Lyly    Clarity, UA Clear Clear    Specific Gravity  1.010 1.005 - 1.030    pH, Urine 8.0 5.0 - 8.0    Leukocytes Negative Negative    Nitrite, UA Negative Negative    Protein, POC Negative Negative mg/dL    Glucose, UA Negative Negative, 1000 mg/dL (3+) mg/dL    Ketones, UA Negative Negative    Urobilinogen, UA Normal Normal    Bilirubin Negative Negative    Blood, UA 50 Nathanael/ul (A) Negative    Lot Number [id number]     Expiration Date 1-31-20        Assessment/Plan   Livier was seen today for  annual exam.    Diagnoses and all orders for this visit:    Encounter for preventive health examination    Encounter for screening for other disorder  -     T4, Free  -     TSH  -     CBC & Differential  -     Comprehensive Metabolic Panel  -     Lipid Panel  -     CBC Auto Differential    Healthcare maintenance  -     POCT urinalysis dipstick, automated  -     promethazine (PHENERGAN) 25 MG tablet; Take 1 tablet by mouth Every 6 (Six) Hours As Needed for Nausea or Vomiting.    Screening mammogram, encounter for  -     Mammo screening digital tomosynthesis bilateral w CAD; Future    Dysuria  -     sulfamethoxazole-trimethoprim (BACTRIM DS) 800-160 MG per tablet; Take 1 tablet by mouth 2 (Two) Times a Day.    Hematuria, unspecified type  -     Urinalysis, Microscopic Only - Urine, Clean Catch-discussed results and culture pending  -     Urine Culture - Urine, Urine, Clean Catch    Skin change  Patient will inform provider if dermatology desired.             Patient education discussed during this visit:  - avoidance of texting while driving and the need for wearing seatbelt  - use of sunscreen  - healthy sleep habits and appropriate amount of sleep  - H2O consumption, well-balanced diet  - exercise routine which includes at least 150 minutes of cardio per week + muscle strengthening exercises  - immunizations including annual flu vaccination      Return if symptoms worsen or fail to improve.

## 2019-07-25 ENCOUNTER — OFFICE VISIT (OUTPATIENT)
Dept: INTERNAL MEDICINE | Facility: CLINIC | Age: 64
End: 2019-07-25

## 2019-07-25 VITALS
BODY MASS INDEX: 30.78 KG/M2 | SYSTOLIC BLOOD PRESSURE: 130 MMHG | DIASTOLIC BLOOD PRESSURE: 74 MMHG | WEIGHT: 163 LBS | HEART RATE: 78 BPM | HEIGHT: 61 IN | TEMPERATURE: 97.1 F | RESPIRATION RATE: 18 BRPM

## 2019-07-25 DIAGNOSIS — Z00.00 ENCOUNTER FOR PREVENTIVE HEALTH EXAMINATION: Primary | ICD-10-CM

## 2019-07-25 DIAGNOSIS — Z13.89 ENCOUNTER FOR SCREENING FOR OTHER DISORDER: ICD-10-CM

## 2019-07-25 DIAGNOSIS — Z00.00 HEALTHCARE MAINTENANCE: ICD-10-CM

## 2019-07-25 DIAGNOSIS — R31.9 HEMATURIA, UNSPECIFIED TYPE: ICD-10-CM

## 2019-07-25 DIAGNOSIS — R30.0 DYSURIA: ICD-10-CM

## 2019-07-25 DIAGNOSIS — R23.9 SKIN CHANGE: ICD-10-CM

## 2019-07-25 DIAGNOSIS — Z12.31 SCREENING MAMMOGRAM, ENCOUNTER FOR: ICD-10-CM

## 2019-07-25 LAB
ALBUMIN SERPL-MCNC: 4.7 G/DL (ref 3.5–5.2)
ALBUMIN/GLOB SERPL: 1.9 G/DL
ALP SERPL-CCNC: 82 U/L (ref 39–117)
ALT SERPL W P-5'-P-CCNC: 22 U/L (ref 1–33)
ANION GAP SERPL CALCULATED.3IONS-SCNC: 11.1 MMOL/L (ref 5–15)
AST SERPL-CCNC: 24 U/L (ref 1–32)
BACTERIA UR QL AUTO: ABNORMAL /HPF
BASOPHILS # BLD AUTO: 0.05 10*3/MM3 (ref 0–0.2)
BASOPHILS NFR BLD AUTO: 0.7 % (ref 0–1.5)
BILIRUB BLD-MCNC: NEGATIVE MG/DL
BILIRUB SERPL-MCNC: 0.4 MG/DL (ref 0.2–1.2)
BUN BLD-MCNC: 10 MG/DL (ref 8–23)
BUN/CREAT SERPL: 10.3 (ref 7–25)
CALCIUM SPEC-SCNC: 9.8 MG/DL (ref 8.6–10.5)
CHLORIDE SERPL-SCNC: 94 MMOL/L (ref 98–107)
CHOLEST SERPL-MCNC: 236 MG/DL (ref 0–200)
CLARITY, POC: CLEAR
CO2 SERPL-SCNC: 27.9 MMOL/L (ref 22–29)
COLOR UR: YELLOW
CREAT BLD-MCNC: 0.97 MG/DL (ref 0.57–1)
DEPRECATED RDW RBC AUTO: 42.2 FL (ref 37–54)
EOSINOPHIL # BLD AUTO: 0.08 10*3/MM3 (ref 0–0.4)
EOSINOPHIL NFR BLD AUTO: 1.1 % (ref 0.3–6.2)
ERYTHROCYTE [DISTWIDTH] IN BLOOD BY AUTOMATED COUNT: 12.2 % (ref 12.3–15.4)
EXPIRATION DATE: ABNORMAL
GFR SERPL CREATININE-BSD FRML MDRD: 58 ML/MIN/1.73
GLOBULIN UR ELPH-MCNC: 2.5 GM/DL
GLUCOSE BLD-MCNC: 92 MG/DL (ref 65–99)
GLUCOSE UR STRIP-MCNC: NEGATIVE MG/DL
HCT VFR BLD AUTO: 38.4 % (ref 34–46.6)
HDLC SERPL-MCNC: 50 MG/DL (ref 40–60)
HGB BLD-MCNC: 12.6 G/DL (ref 12–15.9)
HYALINE CASTS UR QL AUTO: ABNORMAL /LPF
IMM GRANULOCYTES # BLD AUTO: 0.02 10*3/MM3 (ref 0–0.05)
IMM GRANULOCYTES NFR BLD AUTO: 0.3 % (ref 0–0.5)
KETONES UR QL: NEGATIVE
LDLC SERPL CALC-MCNC: 160 MG/DL (ref 0–100)
LDLC/HDLC SERPL: 3.2 {RATIO}
LEUKOCYTE EST, POC: NEGATIVE
LYMPHOCYTES # BLD AUTO: 2.3 10*3/MM3 (ref 0.7–3.1)
LYMPHOCYTES NFR BLD AUTO: 31.6 % (ref 19.6–45.3)
Lab: ABNORMAL
MCH RBC QN AUTO: 30.8 PG (ref 26.6–33)
MCHC RBC AUTO-ENTMCNC: 32.8 G/DL (ref 31.5–35.7)
MCV RBC AUTO: 93.9 FL (ref 79–97)
MONOCYTES # BLD AUTO: 0.59 10*3/MM3 (ref 0.1–0.9)
MONOCYTES NFR BLD AUTO: 8.1 % (ref 5–12)
NEUTROPHILS # BLD AUTO: 4.25 10*3/MM3 (ref 1.7–7)
NEUTROPHILS NFR BLD AUTO: 58.2 % (ref 42.7–76)
NITRITE UR-MCNC: NEGATIVE MG/ML
NRBC BLD AUTO-RTO: 0 /100 WBC (ref 0–0.2)
PH UR: 8 [PH] (ref 5–8)
PLATELET # BLD AUTO: 337 10*3/MM3 (ref 140–450)
PMV BLD AUTO: 9.9 FL (ref 6–12)
POTASSIUM BLD-SCNC: 4.1 MMOL/L (ref 3.5–5.2)
PROT SERPL-MCNC: 7.2 G/DL (ref 6–8.5)
PROT UR STRIP-MCNC: NEGATIVE MG/DL
RBC # BLD AUTO: 4.09 10*6/MM3 (ref 3.77–5.28)
RBC # UR STRIP: ABNORMAL /UL
RBC # UR: ABNORMAL /HPF
REF LAB TEST METHOD: ABNORMAL
SODIUM BLD-SCNC: 133 MMOL/L (ref 136–145)
SP GR UR: 1.01 (ref 1–1.03)
SQUAMOUS #/AREA URNS HPF: ABNORMAL /HPF
T4 FREE SERPL-MCNC: 1.37 NG/DL (ref 0.93–1.7)
TRIGL SERPL-MCNC: 129 MG/DL (ref 0–150)
TSH SERPL DL<=0.05 MIU/L-ACNC: 1.62 MIU/ML (ref 0.27–4.2)
UROBILINOGEN UR QL: NORMAL
VLDLC SERPL-MCNC: 25.8 MG/DL (ref 5–40)
WBC NRBC COR # BLD: 7.29 10*3/MM3 (ref 3.4–10.8)
WBC UR QL AUTO: ABNORMAL /HPF

## 2019-07-25 PROCEDURE — 99396 PREV VISIT EST AGE 40-64: CPT | Performed by: NURSE PRACTITIONER

## 2019-07-25 PROCEDURE — 81003 URINALYSIS AUTO W/O SCOPE: CPT | Performed by: NURSE PRACTITIONER

## 2019-07-25 PROCEDURE — 84439 ASSAY OF FREE THYROXINE: CPT | Performed by: NURSE PRACTITIONER

## 2019-07-25 PROCEDURE — 80053 COMPREHEN METABOLIC PANEL: CPT | Performed by: NURSE PRACTITIONER

## 2019-07-25 PROCEDURE — 87086 URINE CULTURE/COLONY COUNT: CPT | Performed by: NURSE PRACTITIONER

## 2019-07-25 PROCEDURE — 80061 LIPID PANEL: CPT | Performed by: NURSE PRACTITIONER

## 2019-07-25 PROCEDURE — 81015 MICROSCOPIC EXAM OF URINE: CPT | Performed by: NURSE PRACTITIONER

## 2019-07-25 PROCEDURE — 85025 COMPLETE CBC W/AUTO DIFF WBC: CPT | Performed by: NURSE PRACTITIONER

## 2019-07-25 PROCEDURE — 84443 ASSAY THYROID STIM HORMONE: CPT | Performed by: NURSE PRACTITIONER

## 2019-07-25 RX ORDER — SULFAMETHOXAZOLE AND TRIMETHOPRIM 800; 160 MG/1; MG/1
1 TABLET ORAL 2 TIMES DAILY
Qty: 20 TABLET | Refills: 0 | Status: SHIPPED | OUTPATIENT
Start: 2019-07-25 | End: 2019-11-12

## 2019-07-25 RX ORDER — PROMETHAZINE HYDROCHLORIDE 25 MG/1
25 TABLET ORAL EVERY 6 HOURS PRN
Qty: 20 TABLET | Refills: 0 | Status: SHIPPED | OUTPATIENT
Start: 2019-07-25

## 2019-07-26 ENCOUNTER — TELEPHONE (OUTPATIENT)
Dept: INTERNAL MEDICINE | Facility: CLINIC | Age: 64
End: 2019-07-26

## 2019-07-26 LAB — BACTERIA SPEC AEROBE CULT: NO GROWTH

## 2019-07-26 NOTE — TELEPHONE ENCOUNTER
----- Message from Kourtney Dowling sent at 7/26/2019  1:19 PM EDT -----  Contact: Livier Bah needs clarification on how many days she needs to take medication. She said that in 2018 when she was prescribed same medication she took it for 8 days.    The medication in question is:  Sulfamethoxazole-Trimethoprim 800-160 mg.    She can be reached at [phone]

## 2019-07-26 NOTE — TELEPHONE ENCOUNTER
Patient informed to take Sulfamethoxzole-Trimethoprim 800-160 MG twice a day for 10 days. Verb understanding given.

## 2019-07-26 NOTE — TELEPHONE ENCOUNTER
Do you want her to take this medication for 10 days? Her quantity is #20 2 tablets daily. Please advise.

## 2019-07-29 ENCOUNTER — TELEPHONE (OUTPATIENT)
Dept: INTERNAL MEDICINE | Facility: CLINIC | Age: 64
End: 2019-07-29

## 2019-07-29 NOTE — TELEPHONE ENCOUNTER
----- Message from Yasmine Garcia sent at 7/29/2019  9:49 AM EDT -----  Pt returned voicemail regarding labs.     Pt can be reached at [phone].    Thank you.

## 2019-08-21 ENCOUNTER — CLINICAL SUPPORT (OUTPATIENT)
Dept: INTERNAL MEDICINE | Facility: CLINIC | Age: 64
End: 2019-08-21

## 2019-08-21 ENCOUNTER — TELEPHONE (OUTPATIENT)
Dept: INTERNAL MEDICINE | Facility: CLINIC | Age: 64
End: 2019-08-21

## 2019-08-21 DIAGNOSIS — Z23 IMMUNIZATION DUE: ICD-10-CM

## 2019-08-21 PROCEDURE — 90632 HEPA VACCINE ADULT IM: CPT | Performed by: INTERNAL MEDICINE

## 2019-08-21 PROCEDURE — 90471 IMMUNIZATION ADMIN: CPT | Performed by: INTERNAL MEDICINE

## 2019-09-19 ENCOUNTER — HOSPITAL ENCOUNTER (OUTPATIENT)
Dept: MAMMOGRAPHY | Facility: HOSPITAL | Age: 64
Discharge: HOME | End: 2019-09-19
Admitting: INTERNAL MEDICINE

## 2019-09-19 DIAGNOSIS — Z12.31 SCREENING MAMMOGRAM, ENCOUNTER FOR: ICD-10-CM

## 2019-09-19 PROCEDURE — 77067 SCR MAMMO BI INCL CAD: CPT

## 2019-09-19 PROCEDURE — 77063 BREAST TOMOSYNTHESIS BI: CPT

## 2019-09-19 PROCEDURE — 77063 BREAST TOMOSYNTHESIS BI: CPT | Performed by: RADIOLOGY

## 2019-09-19 PROCEDURE — 77067 SCR MAMMO BI INCL CAD: CPT | Performed by: RADIOLOGY

## 2019-10-14 DIAGNOSIS — R52 PAIN: ICD-10-CM

## 2019-10-15 RX ORDER — IBUPROFEN 600 MG/1
TABLET ORAL
Qty: 60 TABLET | Refills: 2 | Status: SHIPPED | OUTPATIENT
Start: 2019-10-15 | End: 2020-06-01 | Stop reason: SDUPTHER

## 2019-11-12 ENCOUNTER — OFFICE VISIT (OUTPATIENT)
Dept: INTERNAL MEDICINE | Facility: CLINIC | Age: 64
End: 2019-11-12

## 2019-11-12 VITALS
SYSTOLIC BLOOD PRESSURE: 134 MMHG | WEIGHT: 165 LBS | DIASTOLIC BLOOD PRESSURE: 78 MMHG | HEART RATE: 70 BPM | TEMPERATURE: 97.8 F | BODY MASS INDEX: 31.18 KG/M2

## 2019-11-12 DIAGNOSIS — H65.92 RECURRENT SEROUS OTITIS MEDIA, LEFT: Primary | ICD-10-CM

## 2019-11-12 PROCEDURE — 99214 OFFICE O/P EST MOD 30 MIN: CPT | Performed by: INTERNAL MEDICINE

## 2019-11-12 RX ORDER — AMOXICILLIN AND CLAVULANATE POTASSIUM 875; 125 MG/1; MG/1
1 TABLET, FILM COATED ORAL 2 TIMES DAILY
Qty: 14 TABLET | Refills: 0 | Status: SHIPPED | OUTPATIENT
Start: 2019-11-12 | End: 2020-09-11

## 2019-11-12 NOTE — PROGRESS NOTES
Subjective   Livier Valerio is a 64 y.o. female.     History of Present Illness   She has had recurrent problems with her left maxillary sinus and left ear.  It began to get worse a few days ago and now has left ear pain and fullness.  No fever, sore throat or cough.  Very similar to previous episodes.    The following portions of the patient's history were reviewed and updated as appropriate: allergies, current medications, past medical history, past surgical history and problem list.    Review of Systems   Constitutional: Negative.  Negative for fatigue and fever.   HENT: Positive for sinus pressure. Negative for ear pain, sore throat and swollen glands.    Eyes: Negative.    Respiratory: Negative.  Negative for cough, chest tightness, shortness of breath and wheezing.    Cardiovascular: Negative.  Negative for chest pain, palpitations and leg swelling.   Gastrointestinal: Negative.        Objective   Physical Exam   Constitutional: She appears well-developed and well-nourished.   HENT:   Mouth/Throat: Oropharynx is clear and moist.   Left TM looks like may some fluid and looks may have had an old perforation.  No redness.   Nursing note and vitals reviewed.        Assessment/Plan   Livier was seen today for earache.    Diagnoses and all orders for this visit:    Recurrent serous otitis media, left    Other orders  -     amoxicillin-clavulanate (AUGMENTIN) 875-125 MG per tablet; Take 1 tablet by mouth 2 (Two) Times a Day.    Will treat as above.  Discussed seeing ENT if this keeps recurring.  She will discuss with Dr. Saul.  She will call if not better.

## 2020-03-11 DIAGNOSIS — I10 ESSENTIAL HYPERTENSION: ICD-10-CM

## 2020-03-11 RX ORDER — LISINOPRIL AND HYDROCHLOROTHIAZIDE 12.5; 1 MG/1; MG/1
TABLET ORAL
Qty: 90 TABLET | Refills: 0 | Status: SHIPPED | OUTPATIENT
Start: 2020-03-11 | End: 2020-05-29

## 2020-03-16 DIAGNOSIS — M62.838 MUSCLE SPASM: ICD-10-CM

## 2020-03-17 RX ORDER — CYCLOBENZAPRINE HCL 10 MG
TABLET ORAL
Qty: 50 TABLET | Refills: 0 | Status: SHIPPED | OUTPATIENT
Start: 2020-03-17 | End: 2020-05-29

## 2020-05-29 DIAGNOSIS — I10 ESSENTIAL HYPERTENSION: ICD-10-CM

## 2020-05-29 DIAGNOSIS — M62.838 MUSCLE SPASM: ICD-10-CM

## 2020-05-29 RX ORDER — LISINOPRIL AND HYDROCHLOROTHIAZIDE 12.5; 1 MG/1; MG/1
TABLET ORAL
Qty: 30 TABLET | Refills: 0 | Status: SHIPPED | OUTPATIENT
Start: 2020-05-29 | End: 2020-06-01 | Stop reason: SDUPTHER

## 2020-05-29 RX ORDER — CYCLOBENZAPRINE HCL 10 MG
TABLET ORAL
Qty: 50 TABLET | Refills: 0 | Status: SHIPPED | OUTPATIENT
Start: 2020-05-29 | End: 2020-06-01 | Stop reason: SDUPTHER

## 2020-06-01 ENCOUNTER — TELEMEDICINE (OUTPATIENT)
Dept: INTERNAL MEDICINE | Facility: CLINIC | Age: 65
End: 2020-06-01

## 2020-06-01 VITALS — SYSTOLIC BLOOD PRESSURE: 100 MMHG | DIASTOLIC BLOOD PRESSURE: 75 MMHG | HEART RATE: 97 BPM

## 2020-06-01 DIAGNOSIS — I10 ESSENTIAL HYPERTENSION: ICD-10-CM

## 2020-06-01 DIAGNOSIS — M62.838 MUSCLE SPASM: ICD-10-CM

## 2020-06-01 DIAGNOSIS — R52 PAIN: ICD-10-CM

## 2020-06-01 PROCEDURE — 99213 OFFICE O/P EST LOW 20 MIN: CPT | Performed by: INTERNAL MEDICINE

## 2020-06-01 RX ORDER — IBUPROFEN 600 MG/1
600 TABLET ORAL EVERY 6 HOURS PRN
Qty: 60 TABLET | Refills: 2 | Status: SHIPPED | OUTPATIENT
Start: 2020-06-01

## 2020-06-01 RX ORDER — CYCLOBENZAPRINE HCL 10 MG
10 TABLET ORAL 3 TIMES DAILY PRN
Qty: 50 TABLET | Refills: 2 | Status: SHIPPED | OUTPATIENT
Start: 2020-06-01 | End: 2020-11-10

## 2020-06-01 RX ORDER — LISINOPRIL AND HYDROCHLOROTHIAZIDE 12.5; 1 MG/1; MG/1
1 TABLET ORAL DAILY
Qty: 90 TABLET | Refills: 3 | Status: SHIPPED | OUTPATIENT
Start: 2020-06-01

## 2020-06-01 NOTE — PROGRESS NOTES
Subjective   Livier Valerio is a 65 y.o. female.     History of Present Illness     The following portions of the patient's history were reviewed and updated as appropriate: allergies, current medications, past family history, past medical history, past social history, past surgical history and problem list.    Patient has consented to a video visit.  This video visit was initiated using [email or web address].    1HTN- chronic and controlled.  Blood pressures been well controlled and she said no side effects on medication.  Patient denies any shortness of breath, chest pain, nausea, vomiting, headache, fatigue, or any other systemic symptoms.    2 muscle strain/mild arthritis-patient continues with NSAIDs and muscle relaxers to help with that both the muscle strain and arthritis.  Both these medications have been doing very well with control of her symptoms.  Patient denies any side effects of the medication and they have helped her overall quality life.    Review of Systems   All other systems reviewed and are negative.      Objective   Physical Exam   Constitutional: She is oriented to person, place, and time. She appears well-developed and well-nourished.   HENT:   Head: Normocephalic.   Right Ear: External ear normal.   Left Ear: External ear normal.   Nose: Nose normal.   Mouth/Throat: Oropharynx is clear and moist.   Eyes: Pupils are equal, round, and reactive to light. Conjunctivae and EOM are normal.   Neck: Normal range of motion. Neck supple.   Cardiovascular: Normal rate, regular rhythm and normal heart sounds.   Pulmonary/Chest: Effort normal and breath sounds normal.   Abdominal: Soft. Bowel sounds are normal.   Musculoskeletal: Normal range of motion.   Neurological: She is alert and oriented to person, place, and time.   Skin: Skin is warm.   Psychiatric: She has a normal mood and affect. Her behavior is normal.   Nursing note and vitals reviewed.        Assessment/Plan   Diagnoses and all orders for this  visit:    Spent 15 minutes via video chat with patient.    Pain  -     ibuprofen (ADVIL,MOTRIN) 600 MG tablet; Take 1 tablet by mouth Every 6 (Six) Hours As Needed for Mild Pain .    Muscle spasm  -     cyclobenzaprine (FLEXERIL) 10 MG tablet; Take 1 tablet by mouth 3 (Three) Times a Day As Needed for Muscle Spasms. for muscle spams    Essential hypertension  -     lisinopril-hydrochlorothiazide (PRINZIDE,ZESTORETIC) 10-12.5 MG per tablet; Take 1 tablet by mouth Daily.

## 2020-09-11 ENCOUNTER — OFFICE VISIT (OUTPATIENT)
Dept: INTERNAL MEDICINE | Facility: CLINIC | Age: 65
End: 2020-09-11

## 2020-09-11 VITALS
OXYGEN SATURATION: 98 % | DIASTOLIC BLOOD PRESSURE: 70 MMHG | HEART RATE: 98 BPM | BODY MASS INDEX: 29.1 KG/M2 | HEIGHT: 62 IN | RESPIRATION RATE: 20 BRPM | WEIGHT: 158.13 LBS | SYSTOLIC BLOOD PRESSURE: 110 MMHG | TEMPERATURE: 98.5 F

## 2020-09-11 DIAGNOSIS — E55.9 VITAMIN D DEFICIENCY: ICD-10-CM

## 2020-09-11 DIAGNOSIS — R53.82 CHRONIC FATIGUE: ICD-10-CM

## 2020-09-11 DIAGNOSIS — Z00.00 WELL ADULT EXAM: Primary | ICD-10-CM

## 2020-09-11 DIAGNOSIS — E53.8 VITAMIN B12 DEFICIENCY: ICD-10-CM

## 2020-09-11 DIAGNOSIS — I10 ESSENTIAL HYPERTENSION: ICD-10-CM

## 2020-09-11 PROCEDURE — 84439 ASSAY OF FREE THYROXINE: CPT | Performed by: INTERNAL MEDICINE

## 2020-09-11 PROCEDURE — 85027 COMPLETE CBC AUTOMATED: CPT | Performed by: INTERNAL MEDICINE

## 2020-09-11 PROCEDURE — 82306 VITAMIN D 25 HYDROXY: CPT | Performed by: INTERNAL MEDICINE

## 2020-09-11 PROCEDURE — 80061 LIPID PANEL: CPT | Performed by: INTERNAL MEDICINE

## 2020-09-11 PROCEDURE — 80053 COMPREHEN METABOLIC PANEL: CPT | Performed by: INTERNAL MEDICINE

## 2020-09-11 PROCEDURE — 84443 ASSAY THYROID STIM HORMONE: CPT | Performed by: INTERNAL MEDICINE

## 2020-09-11 PROCEDURE — 82607 VITAMIN B-12: CPT | Performed by: INTERNAL MEDICINE

## 2020-09-11 PROCEDURE — 99214 OFFICE O/P EST MOD 30 MIN: CPT | Performed by: INTERNAL MEDICINE

## 2020-09-11 NOTE — PROGRESS NOTES
Subjective   Livier Valerio is a 65 y.o. female.     History of Present Illness     The following portions of the patient's history were reviewed and updated as appropriate: allergies, current medications, past family history, past medical history, past social history, past surgical history and problem list.        Complete Adult Physical     1 questions on electrolyte balance and water intake.    2 visual field defect- localized to right eye.  Patient says that she has developed a a visual defect in the right eye to which when she is looking at different directions the visual defect does not go away.  Patient said that this was brought on by a headache at one point time and has noticed that the change in the visual defect has gone from darker brown to more lighter color.      Diet regular        Exercise minimal to none        Social History: No EtOH consumption, no IV drug use, no marijuana, illicit drugs.        Preventative Screenings  Colonoscopy- 7/2018 Cologuard negativve   Mammogram: Up-to-date  Pap smear: Last one in 2018        Immunizations: Up-to-date          Review of Systems   All other systems reviewed and are negative.      Objective   Physical Exam   Constitutional: She is oriented to person, place, and time. She appears well-developed.   HENT:   Head: Normocephalic.   Right Ear: External ear normal.   Left Ear: External ear normal.   Nose: Nose normal.   Mouth/Throat: Oropharynx is clear and moist.   Eyes: Pupils are equal, round, and reactive to light. Conjunctivae and EOM are normal.   Neck: Normal range of motion. Neck supple.   Cardiovascular: Normal rate, regular rhythm, normal heart sounds and intact distal pulses.   Pulmonary/Chest: Effort normal and breath sounds normal.   Abdominal: Soft. Bowel sounds are normal.   Musculoskeletal: Normal range of motion.   Neurological: She is alert and oriented to person, place, and time.   Skin: Skin is warm.   Psychiatric: She has a normal  mood and affect. Her behavior is normal. Judgment and thought content normal.   Nursing note and vitals reviewed.        Assessment/Plan

## 2020-09-12 LAB
25(OH)D3 SERPL-MCNC: 47 NG/ML (ref 30–100)
ALBUMIN SERPL-MCNC: 5 G/DL (ref 3.5–5.2)
ALBUMIN/GLOB SERPL: 1.7 G/DL
ALP SERPL-CCNC: 100 U/L (ref 39–117)
ALT SERPL W P-5'-P-CCNC: 22 U/L (ref 1–33)
ANION GAP SERPL CALCULATED.3IONS-SCNC: 9.7 MMOL/L (ref 5–15)
AST SERPL-CCNC: 23 U/L (ref 1–32)
BILIRUB SERPL-MCNC: 0.4 MG/DL (ref 0–1.2)
BUN SERPL-MCNC: 10 MG/DL (ref 8–23)
BUN/CREAT SERPL: 12 (ref 7–25)
CALCIUM SPEC-SCNC: 9.8 MG/DL (ref 8.6–10.5)
CHLORIDE SERPL-SCNC: 97 MMOL/L (ref 98–107)
CHOLEST SERPL-MCNC: 225 MG/DL (ref 0–200)
CO2 SERPL-SCNC: 27.3 MMOL/L (ref 22–29)
CREAT SERPL-MCNC: 0.83 MG/DL (ref 0.57–1)
DEPRECATED RDW RBC AUTO: 42.6 FL (ref 37–54)
ERYTHROCYTE [DISTWIDTH] IN BLOOD BY AUTOMATED COUNT: 12.1 % (ref 12.3–15.4)
GFR SERPL CREATININE-BSD FRML MDRD: 69 ML/MIN/1.73
GLOBULIN UR ELPH-MCNC: 2.9 GM/DL
GLUCOSE SERPL-MCNC: 95 MG/DL (ref 65–99)
HCT VFR BLD AUTO: 40 % (ref 34–46.6)
HDLC SERPL-MCNC: 53 MG/DL (ref 40–60)
HGB BLD-MCNC: 13.3 G/DL (ref 12–15.9)
LDLC SERPL CALC-MCNC: 146 MG/DL (ref 0–100)
LDLC/HDLC SERPL: 2.76 {RATIO}
MCH RBC QN AUTO: 31.4 PG (ref 26.6–33)
MCHC RBC AUTO-ENTMCNC: 33.3 G/DL (ref 31.5–35.7)
MCV RBC AUTO: 94.3 FL (ref 79–97)
PLATELET # BLD AUTO: 343 10*3/MM3 (ref 140–450)
PMV BLD AUTO: 10 FL (ref 6–12)
POTASSIUM SERPL-SCNC: 3.8 MMOL/L (ref 3.5–5.2)
PROT SERPL-MCNC: 7.9 G/DL (ref 6–8.5)
RBC # BLD AUTO: 4.24 10*6/MM3 (ref 3.77–5.28)
SODIUM SERPL-SCNC: 134 MMOL/L (ref 136–145)
T4 FREE SERPL-MCNC: 1.37 NG/DL (ref 0.93–1.7)
TRIGL SERPL-MCNC: 128 MG/DL (ref 0–150)
TSH SERPL DL<=0.05 MIU/L-ACNC: 0.92 UIU/ML (ref 0.27–4.2)
VIT B12 BLD-MCNC: 775 PG/ML (ref 211–946)
VLDLC SERPL-MCNC: 25.6 MG/DL (ref 5–40)
WBC # BLD AUTO: 7.75 10*3/MM3 (ref 3.4–10.8)

## 2020-11-09 DIAGNOSIS — M62.838 MUSCLE SPASM: ICD-10-CM

## 2020-11-10 RX ORDER — CYCLOBENZAPRINE HCL 10 MG
TABLET ORAL
Qty: 50 TABLET | Refills: 0 | Status: SHIPPED | OUTPATIENT
Start: 2020-11-10